# Patient Record
Sex: FEMALE | Race: WHITE | NOT HISPANIC OR LATINO | Employment: OTHER | URBAN - METROPOLITAN AREA
[De-identification: names, ages, dates, MRNs, and addresses within clinical notes are randomized per-mention and may not be internally consistent; named-entity substitution may affect disease eponyms.]

---

## 2017-03-20 ENCOUNTER — OFFICE VISIT (OUTPATIENT)
Dept: UROLOGY | Facility: CLINIC | Age: 67
End: 2017-03-20
Payer: MEDICARE

## 2017-03-20 VITALS
HEART RATE: 56 BPM | DIASTOLIC BLOOD PRESSURE: 92 MMHG | BODY MASS INDEX: 25.07 KG/M2 | HEIGHT: 66 IN | SYSTOLIC BLOOD PRESSURE: 131 MMHG | WEIGHT: 156 LBS

## 2017-03-20 DIAGNOSIS — R31.29 MICROSCOPIC HEMATURIA: Primary | ICD-10-CM

## 2017-03-20 DIAGNOSIS — N95.2 VAGINAL ATROPHY: ICD-10-CM

## 2017-03-20 PROBLEM — M81.0 OSTEOPOROSIS: Status: ACTIVE | Noted: 2017-03-20

## 2017-03-20 PROBLEM — I10 HYPERTENSION: Status: ACTIVE | Noted: 2017-03-20

## 2017-03-20 PROBLEM — I48.20 CHRONIC ATRIAL FIBRILLATION: Status: ACTIVE | Noted: 2017-03-20

## 2017-03-20 PROBLEM — I10 HYPERTENSION: Status: RESOLVED | Noted: 2017-03-20 | Resolved: 2017-03-20

## 2017-03-20 PROCEDURE — 81002 URINALYSIS NONAUTO W/O SCOPE: CPT | Mod: PBBFAC | Performed by: UROLOGY

## 2017-03-20 PROCEDURE — 99203 OFFICE O/P NEW LOW 30 MIN: CPT | Mod: PBBFAC | Performed by: UROLOGY

## 2017-03-20 PROCEDURE — 88112 CYTOPATH CELL ENHANCE TECH: CPT | Mod: 26,,, | Performed by: PATHOLOGY

## 2017-03-20 PROCEDURE — 99999 PR PBB SHADOW E&M-NEW PATIENT-LVL III: CPT | Mod: PBBFAC,,, | Performed by: UROLOGY

## 2017-03-20 PROCEDURE — 88112 CYTOPATH CELL ENHANCE TECH: CPT | Performed by: PATHOLOGY

## 2017-03-20 PROCEDURE — 99205 OFFICE O/P NEW HI 60 MIN: CPT | Mod: S$PBB,,, | Performed by: UROLOGY

## 2017-03-20 RX ORDER — ESTRADIOL 0.1 MG/G
1 CREAM VAGINAL
Qty: 45 G | Refills: 3 | Status: SHIPPED | OUTPATIENT
Start: 2017-03-20 | End: 2020-06-11

## 2017-03-20 RX ORDER — CIPROFLOXACIN 250 MG/1
500 TABLET, FILM COATED ORAL ONCE
Status: CANCELLED | OUTPATIENT
Start: 2017-03-20 | End: 2017-03-20

## 2017-03-20 RX ORDER — RIVAROXABAN 20 MG/1
20 TABLET, FILM COATED ORAL DAILY
COMMUNITY
Start: 2017-03-18 | End: 2020-03-25 | Stop reason: SDUPTHER

## 2017-03-20 RX ORDER — LIDOCAINE HYDROCHLORIDE 20 MG/ML
JELLY TOPICAL ONCE
Status: CANCELLED | OUTPATIENT
Start: 2017-03-20 | End: 2017-03-20

## 2017-03-20 NOTE — MR AVS SNAPSHOT
[facility] Urology 4th Floor  [address]  [facility] [address]  Phone: [phone]                  Eliz Hartman   3/20/2017 3:20 PM   Office Visit    Description:  Female : 1950   Provider:  Rosie Monaco MD   Department:  [facility] - Urolog 4th Floor           Reason for Visit     Hematuria           Diagnoses this Visit        Comments    Vaginal atrophy    -  Primary     Microscopic hematuria         Chronic atrial fibrillation         Osteoporosis                To Do List           Future Appointments        Provider Department Dept Phone    3/20/2017 4:55 PM LAB, SAME DAY [facility] [phone]    2017 8:00 AM Tomasa Garcia MD [facility] - Endo/Diab/Metab [phone]      Goals (5 Years of Data)     None       These Medications        Disp Refills Start End    estradiol (ESTRACE) 0.01 % (0.1 mg/gram) vaginal cream 45 g 3 3/20/2017 2017    Place 1 g vaginally 3 (three) times a week. Place by fingertip application before bedtime three times a week (Monday, Wednesday, Friday) - Vaginal    Pharmacy: [facility] [address] [place] [address] [id number] [facility] [address]  & [facility] Ph #: [phone]         [facility]     [facility] Care Line -  Assistance  Registered nurses in the [facility] provide clinical advisement, health education, appointment booking, and other advisory services.  Call for this free service at [phone].             Medications           Message regarding Medications     Verify the changes and/or additions to your medication regime listed below are the same as discussed with your clinician today.  If any of these changes or additions are incorrect, please notify your healthcare provider.        START taking these NEW medications        Refills    estradiol (ESTRACE) 0.01 % (0.1 mg/gram) vaginal cream 3    Sig: Place 1 g vaginally 3 (three) times a week. Place by fingertip  "application before bedtime three times a week (Monday, Wednesday, Friday)    Class: Print    Route: Vaginal           Verify that the below list of medications is an accurate representation of the medications you are currently taking.  If none reported, the list may be blank. If incorrect, please contact your healthcare provider. Carry this list with you in case of emergency.           Current Medications     estradiol (ESTRACE) 0.01 % (0.1 mg/gram) vaginal cream Place 1 g vaginally 3 (three) times a week. Place by fingertip application before bedtime three times a week (Monday, Wednesday, Friday)    XARELTO 20 mg Tab Take 20 mg by mouth once daily.           Clinical Reference Information           Your Vitals Were     BP Pulse Height Weight BMI    131/92 56 5' 6" (1.676 m) 70.8 kg (156 lb) 25.18 kg/m2      Blood Pressure          Most Recent Value    BP  (!)  131/92      Allergies as of 3/20/2017     No Known Allergies      Immunizations Administered on Date of Encounter - 3/20/2017     None      Orders Placed During Today's Visit      Normal Orders This Visit    Cytology, urine     Future Labs/Procedures Expected by Expires    Basic metabolic panel  3/20/2017 5/19/2018    CT Abdomen Pelvis W Wo Contrast  3/20/2017 3/20/2018    Cystoscopy  As directed 3/20/2018      [facility] Sign-Up     Activating your [facility] account is as easy as 1-2-3!     1) Visit [email or web address], select Sign Up Now, enter this activation code and your date of birth, then select Next.  [email or web address]  Expires: 5/4/2017  3:20 PM      2) Create a username and password to use when you visit [facility] in the future and select a security question in case you lose your password and select Next.    3) Enter your e-mail address and click Sign Up!    Additional Information  If you have questions, please e-mail [email or web address] or call [phone] to talk to our [facility] staff. Remember, [facility] is NOT to be used for urgent needs. For " medical emergencies, dial 911.         Instructions        Cystoscopy    Cystoscopy is a procedure that lets your doctor look directly inside your urethra and bladder. It can be used to:  · Help diagnose a problem with your urethra, bladder, or kidneys.  · Take a sample (biopsy) of bladder or urethral tissue.  · Treat certain problems (such as removing kidney stones).  · Place a stent to bypass an obstruction.  · Take special X-rays of the kidneys.  Based on the findings, your doctor may recommend other tests or treatments.  What is a cystoscope?  A cystoscope is a telescope-like instrument that contains lenses and fiberoptics (small glass wires that make bright light). The cystoscope may be straight and rigid, or flexible to bend around curves in the urethra. The doctor may look directly into the cystoscope, or project the image onto a monitor.  Getting ready  · Ask your doctor if you should stop taking any medications prior to the procedure.  · Ask whether you should avoid eating or drinking anything after midnight before the procedure.  · Follow any other instructions your doctor gives you.  Tell your doctor before the exam if you:  · Take any medications, such as aspirin or blood thinners  · Have allergies to any medications  · Are pregnant   The procedure  Cystoscopy is done in the doctors office or hospital. The doctor and a nurse are present during the procedure. It takes only a few minutes, longer if a biopsy, X-ray, or treatment needs to be done.  During the procedure:  · You lie on an exam table on your back, knees bent and legs apart. You are covered with a drape.  · Your urethra and the area around it are washed. Anesthetic jelly may be applied to numb the urethra. Other pain medication is usually not needed. In some cases, you may be offered a mild sedative to help you relax. If a more extensive procedure is to be done, such as a biopsy or kidney stone removal, general anesthesia may be needed.  · The  cystoscope is inserted. A sterile fluid is put into the bladder to expand it. You may feel pressure from this fluid.  · When the procedure is done, the cystoscope is removed.  After the procedure  If you had a sedative, general anesthesia, or spinal anesthesia, you must have someone drive you home. Once youre home:  · Drink plenty of fluids.  · You may have burning or light bleeding when you urinate--this is normal.  · Medications may be prescribed to ease any discomfort or prevent infection. Take these as directed.  · Call your doctor if you have heavy bleeding or blood clots, burning that lasts more than a day, a fever over 100°F  (38° C), or trouble urinating.  Date Last Reviewed: 9/8/2014 © 2000-2016 [employer]. [address] [place] [address]. All rights reserved. This information is not intended as a substitute for professional medical care. Always follow your healthcare professional's instructions.             Language Assistance Services     ATTENTION: Language assistance services are available, free of charge. Please call [phone].      ATENCIÓN: Si habla español, tiene a bernal disposición servicios gratuitos de asistencia lingüística. Llame al [phone].     PRIMO Ý: N?u b?n nói Ti?ng Vi?t, có các d?ch v? h? tr? ngôn ng? mi?n phí dành cho b?n. G?i s? [phone].         [facility] - Urology 4th Floor complies with applicable Federal civil rights laws and does not discriminate on the basis of race, color, national origin, age, disability, or sex.

## 2017-03-20 NOTE — PROGRESS NOTES
CHIEF COMPLAINT:    Mrs. Hartman is a 66 y.o. female presenting for evaluation of microscopic hematuria    PRESENTING ILLNESS:    Eliz Hartman is a 66 y.o. female who presents with a history of microscopic hematuria.  She brought a report from [facility], states that her primary is joining [facility] and would like to have her healthcare consolidated.  With regards to risk factors, she is a non smoker, there is not history of radiation therapy or chemotherapy.  She is a retired  and has no exposure to aniline dyes or solvents.  There is no family history of kidney or bladder cancer.     G0, uterus in place, status post left unilateral oophorectomy, not sexually active and has normal bowel movements.    REVIEW OF SYSTEMS:    Review of Systems   Constitutional: Negative.    HENT: Negative.    Eyes: Negative.    Respiratory: Negative.    Cardiovascular: Negative.    Gastrointestinal: Negative.    Genitourinary: Negative for dysuria, frequency and urgency.   Musculoskeletal: Negative.    Skin: Negative.    Neurological: Negative.    Endo/Heme/Allergies: Negative.    Psychiatric/Behavioral: Negative.      PATIENT HISTORY:    No past medical history on file.    Past Surgical History:   Procedure Laterality Date    HAND SURGERY      OOPHORECTOMY         No family history on file.    Social History    Marital status: Single     Social History Main Topics    Smoking status: Never Smoker    Smokeless tobacco: Not on file    Alcohol use Yes    Drug use: Not on file    Sexual activity: Not on file       Allergies:  Review of patient's allergies indicates no known allergies.    Medications:  Outpatient Encounter Prescriptions as of 3/20/2017   Medication Sig Dispense Refill    estradiol (ESTRACE) 0.01 % (0.1 mg/gram) vaginal cream Place 1 g vaginally 3 (three) times a week. Place by fingertip application before bedtime three times a week (Monday, Wednesday, Friday) 45 g 3    XARELTO 20 mg Tab Take 20 mg by mouth once daily.        No facility-administered encounter medications on file as of 3/20/2017.          PHYSICAL EXAMINATION:    The patient generally appears in good health, is appropriately interactive, and is in no apparent distress.    Skin: No lesions.    Mental: Cooperative with normal affect.    Neuro: Grossly intact.    HEENT: Normal. No evidence of lymphadenopathy.    Chest:  normal inspiratory effort.    Abdomen: BS active. Soft, non-tender. No masses or organomegaly. Bladder is not palpable. No evidence of flank discomfort. No evidence of inguinal hernia.    Extremities: No clubbing, cyanosis, or edema    Normal external female genitalia  Grade II urogenital atrophy  Urethral meatus is normal  Urethra and bladder are nontender to bimanual exam  Well supported anteriorly and posteriorly   Uterus and cervix are normal  No adnexal masses    LABS:    UA 1.000, pH 8, tr blood, otherwise,    IMPRESSION:    Encounter Diagnoses   Name Primary?    Microscopic hematuria Yes    Vaginal atrophy        PLAN:    1.  BMP, urine cytology  2.  CT abd/pelvis with and without IV contrast  3.  Cystoscopy  4.  Estrace cream for the vaginal atrophy

## 2017-03-20 NOTE — PATIENT INSTRUCTIONS

## 2017-03-31 ENCOUNTER — HOSPITAL ENCOUNTER (OUTPATIENT)
Dept: RADIOLOGY | Facility: HOSPITAL | Age: 67
Discharge: HOME | End: 2017-03-31
Attending: UROLOGY
Payer: MEDICARE

## 2017-03-31 DIAGNOSIS — R31.29 MICROSCOPIC HEMATURIA: ICD-10-CM

## 2017-03-31 PROCEDURE — 25500020 PHARM REV CODE 255: Performed by: UROLOGY

## 2017-03-31 PROCEDURE — 74178 CT ABD&PLV WO CNTR FLWD CNTR: CPT | Mod: TC

## 2017-03-31 PROCEDURE — 74178 CT ABD&PLV WO CNTR FLWD CNTR: CPT | Mod: 26,GC,, | Performed by: RADIOLOGY

## 2017-03-31 RX ADMIN — IOHEXOL 125 ML: 350 INJECTION, SOLUTION INTRAVENOUS at 04:03

## 2017-04-02 ENCOUNTER — PATIENT MESSAGE (OUTPATIENT)
Dept: UROLOGY | Facility: CLINIC | Age: 67
End: 2017-04-02

## 2017-04-03 ENCOUNTER — HOSPITAL ENCOUNTER (OUTPATIENT)
Dept: UROLOGY | Facility: HOSPITAL | Age: 67
Discharge: HOME | End: 2017-04-03
Attending: UROLOGY
Payer: MEDICARE

## 2017-04-03 VITALS
HEIGHT: 66 IN | WEIGHT: 150 LBS | TEMPERATURE: 99 F | BODY MASS INDEX: 24.11 KG/M2 | DIASTOLIC BLOOD PRESSURE: 79 MMHG | HEART RATE: 56 BPM | SYSTOLIC BLOOD PRESSURE: 130 MMHG | RESPIRATION RATE: 16 BRPM

## 2017-04-03 DIAGNOSIS — R31.29 MICROSCOPIC HEMATURIA: ICD-10-CM

## 2017-04-03 PROCEDURE — 52000 CYSTOURETHROSCOPY: CPT | Mod: ,,, | Performed by: UROLOGY

## 2017-04-03 PROCEDURE — 52000 CYSTOURETHROSCOPY: CPT

## 2017-04-03 RX ORDER — CIPROFLOXACIN 500 MG/1
500 TABLET ORAL
Status: COMPLETED | OUTPATIENT
Start: 2017-04-03 | End: 2017-04-03

## 2017-04-03 RX ORDER — LIDOCAINE HYDROCHLORIDE 20 MG/ML
JELLY TOPICAL ONCE
Status: COMPLETED | OUTPATIENT
Start: 2017-04-03 | End: 2017-04-03

## 2017-04-03 RX ADMIN — LIDOCAINE HYDROCHLORIDE: 20 JELLY TOPICAL at 10:04

## 2017-04-03 RX ADMIN — CIPROFLOXACIN 500 MG: 500 TABLET ORAL at 10:04

## 2017-04-03 NOTE — PATIENT INSTRUCTIONS
What to Expect After a Cystoscopy  For the next 24-48 hours, you may feel a mild burning when you urinate. This burning is normal and expected. Drink plenty of water to dilute the urine to help relieve the burning sensation. You may also see a small amount of blood in your urine after the procedure.    Unless you are already taking antibiotics, you may be given an antibiotic after the test to prevent infection.    Signs and Symptoms to Report  Call the [facility] at [phone] if you develop any of the following:  · Fever of 101 degrees or higher  · Chills or persistent bleeding  · Inability to urinate

## 2017-04-03 NOTE — INTERVAL H&P NOTE
The patient has been examined and the H&P has been reviewed:    I concur with the findings and no changes have occurred since H&P was written.        There are no hospital problems to display for this patient.    Proceed with planned cystoscopy.

## 2017-04-03 NOTE — H&P (VIEW-ONLY)
CHIEF COMPLAINT:    Mrs. Hartman is a 66 y.o. female presenting for evaluation of microscopic hematuria    PRESENTING ILLNESS:    Eliz Hartman is a 66 y.o. female who presents with a history of microscopic hematuria.  She brought a report from [facility], states that her primary is joining [facility] and would like to have her healthcare consolidated.  With regards to risk factors, she is a non smoker, there is not history of radiation therapy or chemotherapy.  She is a retired  and has no exposure to aniline dyes or solvents.  There is no family history of kidney or bladder cancer.     G0, uterus in place, status post left unilateral oophorectomy, not sexually active and has normal bowel movements.    REVIEW OF SYSTEMS:    Review of Systems   Constitutional: Negative.    HENT: Negative.    Eyes: Negative.    Respiratory: Negative.    Cardiovascular: Negative.    Gastrointestinal: Negative.    Genitourinary: Negative for dysuria, frequency and urgency.   Musculoskeletal: Negative.    Skin: Negative.    Neurological: Negative.    Endo/Heme/Allergies: Negative.    Psychiatric/Behavioral: Negative.      PATIENT HISTORY:    No past medical history on file.    Past Surgical History:   Procedure Laterality Date    HAND SURGERY      OOPHORECTOMY         No family history on file.    Social History    Marital status: Single     Social History Main Topics    Smoking status: Never Smoker    Smokeless tobacco: Not on file    Alcohol use Yes    Drug use: Not on file    Sexual activity: Not on file       Allergies:  Review of patient's allergies indicates no known allergies.    Medications:  Outpatient Encounter Prescriptions as of 3/20/2017   Medication Sig Dispense Refill    estradiol (ESTRACE) 0.01 % (0.1 mg/gram) vaginal cream Place 1 g vaginally 3 (three) times a week. Place by fingertip application before bedtime three times a week (Monday, Wednesday, Friday) 45 g 3    XARELTO 20 mg Tab Take 20 mg by mouth once daily.        No facility-administered encounter medications on file as of 3/20/2017.          PHYSICAL EXAMINATION:    The patient generally appears in good health, is appropriately interactive, and is in no apparent distress.    Skin: No lesions.    Mental: Cooperative with normal affect.    Neuro: Grossly intact.    HEENT: Normal. No evidence of lymphadenopathy.    Chest:  normal inspiratory effort.    Abdomen: BS active. Soft, non-tender. No masses or organomegaly. Bladder is not palpable. No evidence of flank discomfort. No evidence of inguinal hernia.    Extremities: No clubbing, cyanosis, or edema    Normal external female genitalia  Grade II urogenital atrophy  Urethral meatus is normal  Urethra and bladder are nontender to bimanual exam  Well supported anteriorly and posteriorly   Uterus and cervix are normal  No adnexal masses    LABS:    UA 1.000, pH 8, tr blood, otherwise,    IMPRESSION:    Encounter Diagnoses   Name Primary?    Microscopic hematuria Yes    Vaginal atrophy        PLAN:    1.  BMP, urine cytology  2.  CT abd/pelvis with and without IV contrast  3.  Cystoscopy  4.  Estrace cream for the vaginal atrophy

## 2017-04-03 NOTE — PROCEDURES
CYSTOSCOPY REPORT    Pre Procedure Diagnosis:  Microscopic hematuria    Post Procedure Diagnosis:   Normal lower urinary tract, vaginal atrophy    Anesthesia: 10 cc 2% lidocaine jelly applied per urethra.    14 FR Flexible Olympus cystoscope used.    FINDINGS:  Dome, anterior, posterior, lateral walls and bladder base free of urothelial abnormalities. Right and left ureteral orifices in the normal postion and configuration, both effluxed clear urine.  Bladder neck and urethra were normal.    Specimen:  none    The patient was taken to the cystoscopy suite and placed in dorsal lithotomy position.  The genitalia was prepped and draped  in the usual sterile fashion.  Two percent lidocaine jelly was inserted in the urethra.  After sufficent time had passed to allow good local anesthesia, the cystoscope was inserted in the urethra and passed into the bladder visualizing the urethra along its entire course.  The dome, anterior, posterior and lateral walls were examined systematically.  The ureteral orifices were in their usual position and configuration.  The cystoscope was turned upon itself 180 degrees to visualize the bladder neck.  The cystoscope was then brought to the level of the bladder neck, the water was turned on and the urethra was visualized.  The cystoscope was removed and the patient was instructed to urinate prior to leaving the office.     Post procedure medication:  cipro 500 mg x 1    ADDITIONAL NOTES:  CT scan was reviewed with the patient.  She denies having abdominal pain after eating a large meal, has no flank pain with drinking increased fluids.  The uptake of the right kidney of contrast equals that of the left side.  Neither the median arcuate ligament syndrome nor the right UPJ obstruction appear to be clinically significant.  The right UPJ obstruction is in the form of a kink, not a ureteral mass.       ASSESSMENT/PLAN:  66 year old woman status post flexible cystoscopy.  Microscopic hematuria  is likely secondary to vaginal atrophy.  1. Push fluids for 24 hours.  2. May see blood in the urine, this should gradually improve over the next 2-3 days.  3. The patient was instructed to return to the office or go to the emergency should fever, chills, cloudy urine, or inability to urinate develop.  4. Follow up in 1 year to follow up on the vaginal atrophy.

## 2017-04-03 NOTE — IP AVS SNAPSHOT
"    [facility]  [address] [facility] [address]  Phone: [phone]  Fax: [phone]                  Eliz Hartman   4/3/2017  9:45 AM   Cystoscopy    Description:  Female : 1950   Provider:  [facility]   [facility]           Visit Information     Date & Time Provider Department    4/3/2017  9:45 AM JOHANNA GARCIAY [facility]      Recent Lab Values     No lab values to display.      Reason for Visit     Hematuria           Diagnoses this Visit        Comments    Microscopic hematuria                To Do List           Your Scheduled Appointments     2017  8:00 AM CDT   New Patient with Tomasa Garcia MD   [facility] - Endo/Diab/Metab ([facility] )    [address]  [place] [address] [phone]              Goals (5 Years of Data)     None           Medications                ** Verify the list of medication(s) below is accurate and up to date. Carry this with you in case of emergency. If your medications have changed, please notify your healthcare provider.             Medication List      TAKE these medications        Additional Info                      estradiol 0.01 % (0.1 mg/gram) vaginal cream   Commonly known as:  ESTRACE   Quantity:  45 g   Refills:  3   Dose:  1 g    Instructions:  Place 1 g vaginally 3 (three) times a week. Place by fingertip application before bedtime three times a week (Monday, Wednesday, Friday)     Begin Date    AM    Noon    PM    Bedtime       XARELTO 20 mg Tab   Refills:  0   Dose:  20 mg   Generic drug:  rivaroxaban    Instructions:  Take 20 mg by mouth once daily.     Begin Date    AM    Noon    PM    Bedtime               Your Vitals Were     Pulse Temp Resp Height Weight BMI    56 98.6 °F (37 °C) 16 5' 6" (1.676 m) 68 kg (150 lb) 24.21 kg/m2      Allergies as of 4/3/2017     No Known Allergies      Immunizations Administered on Date " of Encounter - 4/3/2017     None      Instructions    What to Expect After a Cystoscopy  For the next 24-48 hours, you may feel a mild burning when you urinate. This burning is normal and expected. Drink plenty of water to dilute the urine to help relieve the burning sensation. You may also see a small amount of blood in your urine after the procedure.    Unless you are already taking antibiotics, you may be given an antibiotic after the test to prevent infection.    Signs and Symptoms to Report  Call the [facility] at [phone] if you develop any of the following:  · Fever of 101 degrees or higher  · Chills or persistent bleeding  · Inability to urinate       Advance Directives     An advance directive is a document which, in the event you are no longer able to make decisions for yourself, tells your healthcare team what kind of treatment you do or do not want to receive, or who you would like to make those decisions for you.  If you do not currently have an advance directive, [facility] encourages you to create one.  For more information call:  [phone]), [phone] ([phone]),  or log on to [email or web address].        [facility]     [facility] Care [facility] - 24/7 Assistance  Unless otherwise directed by your provider, please contact [facility], our nurse care line that is available for 24/7 assistance.     Registered nurses in the [facility] provide: appointment scheduling, clinical advisement, health education, and other advisory services.  Call: [phone] (toll free)          Language Assistance Services     ATTENTION: Language assistance services are available, free of charge. Please call [phone].      ATENCIÓN: Si habla español, tiene a bernal disposición servicios gratuitos de asistencia lingüística. Llame al [phone].     CHÚ Ý: N?u b?n nói Ti?ng Vi?t, có các d?ch v? h? tr? ngôn ng? mi?n phí dành cho b?n. G?i s?  [phone].         [facility] complies with applicable Federal civil rights laws and does not discriminate on the basis of race, color, national origin, age, disability, or sex.

## 2017-06-12 ENCOUNTER — OFFICE VISIT (OUTPATIENT)
Dept: ENDOCRINOLOGY | Facility: CLINIC | Age: 67
End: 2017-06-12
Payer: MEDICARE

## 2017-06-12 ENCOUNTER — PATIENT MESSAGE (OUTPATIENT)
Dept: ENDOCRINOLOGY | Facility: CLINIC | Age: 67
End: 2017-06-12

## 2017-06-12 VITALS
BODY MASS INDEX: 25.55 KG/M2 | HEIGHT: 66 IN | HEART RATE: 68 BPM | DIASTOLIC BLOOD PRESSURE: 72 MMHG | WEIGHT: 159 LBS | SYSTOLIC BLOOD PRESSURE: 132 MMHG

## 2017-06-12 DIAGNOSIS — M81.0 OSTEOPOROSIS, UNSPECIFIED OSTEOPOROSIS TYPE, UNSPECIFIED PATHOLOGICAL FRACTURE PRESENCE: ICD-10-CM

## 2017-06-12 DIAGNOSIS — E04.2 GOITER, NONTOXIC, MULTINODULAR: Primary | ICD-10-CM

## 2017-06-12 PROCEDURE — 99999 PR PBB SHADOW E&M-EST. PATIENT-LVL III: CPT | Mod: PBBFAC,,, | Performed by: INTERNAL MEDICINE

## 2017-06-12 PROCEDURE — 99203 OFFICE O/P NEW LOW 30 MIN: CPT | Mod: S$PBB,,, | Performed by: INTERNAL MEDICINE

## 2017-06-12 PROCEDURE — 99213 OFFICE O/P EST LOW 20 MIN: CPT | Mod: PBBFAC | Performed by: INTERNAL MEDICINE

## 2017-06-12 PROCEDURE — 1126F AMNT PAIN NOTED NONE PRSNT: CPT | Mod: ,,, | Performed by: INTERNAL MEDICINE

## 2017-06-12 PROCEDURE — 1159F MED LIST DOCD IN RCRD: CPT | Mod: ,,, | Performed by: INTERNAL MEDICINE

## 2017-06-12 RX ORDER — ALENDRONATE SODIUM 70 MG/1
70 TABLET ORAL
COMMUNITY
Start: 2017-04-04 | End: 2018-05-24

## 2017-06-12 NOTE — ASSESSMENT & PLAN NOTE
I have reviewed management options including observation, FNA or surgery.  All of the patients questions were answered and handout was provided.     Discussed indications for a FNA - hers would be right nodule 1.5 cm n size since i am not sure what nodules were biopsied in the past      She opted for observation which is quite reasonable based on lack of thyroid cancer risk factors and overall stability over time     rtc early next year with US prior

## 2017-06-12 NOTE — PROGRESS NOTES
Subjective:      Patient ID: Eliz Hartman is a 66 y.o. female.    Chief Complaint:  Other (Thyroid nodules)      History of Present Illness  With regards to the thyroid nodule:  It was found in there early 50s   She is not concerned about the nodules   She spends 1/2 year here and 1/2 in [place]     No difficulty breathing swallowing   No voice changes  No FH of thyroid cancer  No personal history of radiation treatment or exposure     No signs or symptoms of hyper or hypothyroidism    No weight changes  No bowel changes  No heat or cold intolerance   No hair nail or skin changes  No cp, palpations or sob    She did bring in a thyroid us report from 1/26/17 which was compared 2012 and 2013 reports   Right Heterogenous nodule near isthmus 1.2x0.7x1.5 from 1.1x0.6x1.1 cm  2 sub cm solid nodules  right lobe  +subcm cysts   Left heterogenous 0.7 cm +smaller nodules   No LAD   Total volume of the gland had increased , right nodule increased in size     Us from [place] 2013  Right 1.5 cm and second was 1.1   TSH 0.9 2014   tpo neg   Us from [place] 2012 increase involume of right nodules and decrease of volume of others when compared to 2010       She had 3 fnas in the past and all neg - she will get me records       With regards to osteoporosis:     current medication: fosamax  Started early 2017   Managed by gyn     Takes Calcium +d supplements     Weight bearing exercise?   Walking   Recent falls? No      Left foot stress fracture   Right foot fracture after a fall   No significant height loss (>2 inches)      tob use ?  None       etoh use? Social      No current diarrhea or h/o malabsorption     Review of Systems   Constitutional: Negative for unexpected weight change.   Eyes: Negative for visual disturbance.   Respiratory: Negative for shortness of breath.    Cardiovascular: Negative for chest pain.   Gastrointestinal: Negative for abdominal pain.   Musculoskeletal: Negative for arthralgias.   Skin: Negative for wound.    Neurological: Negative for headaches.   Hematological: Does not bruise/bleed easily.   Psychiatric/Behavioral: Negative for sleep disturbance.       Objective:   Physical Exam   Constitutional: She appears well-developed.   HENT:   Right Ear: External ear normal.   Left Ear: External ear normal.   Nose: Nose normal.   Neck: No tracheal deviation present.   Thyroid palpable, irregular, non tender, mobile  Right small nodule      Cardiovascular: Normal rate.  An irregular rhythm present.   No murmur heard.  Pulmonary/Chest: Effort normal and breath sounds normal.   Abdominal: Soft. There is no tenderness. No hernia.   Musculoskeletal: She exhibits no edema.   Neurological: She displays normal reflexes. No cranial nerve deficit.   Skin: No rash noted.          Psychiatric: She has a normal mood and affect. Judgment normal.   Vitals reviewed.      Body mass index is 25.66 kg/m².    Lab Review:   No results found for: HGBA1C  No results found for: CHOL, HDL, LDLCALC, TRIG, CHOLHDL  Lab Results   Component Value Date     03/20/2017    K 4.2 03/20/2017     03/20/2017    CO2 26 03/20/2017    GLU 85 03/20/2017    BUN 23 03/20/2017    CREATININE 0.9 03/20/2017    CALCIUM 9.1 03/20/2017    ANIONGAP 10 03/20/2017    ESTGFRAFRICA >60.0 03/20/2017    EGFRNONAA >60.0 03/20/2017       Assessment and Plan     Osteoporosis  On fosamax   Expect 5 yrs of therapy   rda calcium and vit d     Goiter, nontoxic, multinodular  I have reviewed management options including observation, FNA or surgery.  All of the patients questions were answered and handout was provided.     Discussed indications for a FNA - hers would be right nodule 1.5 cm n size since i am not sure what nodules were biopsied in the past      She opted for observation which is quite reasonable based on lack of thyroid cancer risk factors and overall stability over time     rtc early next year with US prior

## 2017-07-04 NOTE — PROGRESS NOTES
Subjective:      Patient ID: Eliz Hartman is a 66 y.o. female.    Chief Complaint: Cough and Nasal Congestion    HPI:  Cough   This is a recurrent problem. The current episode started in the past 7 days. The problem has been waxing and waning. The problem occurs hourly. The cough is productive of sputum. Associated symptoms include ear congestion, a fever, headaches, nasal congestion, postnasal drip, a sore throat and wheezing. Pertinent negatives include no chest pain, chills, ear pain, heartburn, hemoptysis, myalgias, rash, rhinorrhea, shortness of breath, sweats or weight loss. The symptoms are aggravated by lying down. She has tried OTC cough suppressant, OTC inhaler and body position changes for the symptoms. The treatment provided mild relief. Her past medical history is significant for bronchitis. There is no history of asthma, bronchiectasis, COPD, emphysema, environmental allergies or pneumonia.     Patient Active Problem List   Diagnosis    Microscopic hematuria    Chronic atrial fibrillation    Osteoporosis    Goiter, nontoxic, multinodular     Past Medical History:   Diagnosis Date    Atrial fibrillation     Multiple thyroid nodules     Osteoporosis      Past Surgical History:   Procedure Laterality Date    HAND SURGERY      OOPHORECTOMY       Family History   Problem Relation Age of Onset    Atrial fibrillation Mother     Parkinsonism Father     Atrial fibrillation Sister     Atrial fibrillation Brother      Review of Systems   Constitutional: Positive for fever. Negative for chills and weight loss.   HENT: Positive for postnasal drip and sore throat. Negative for ear pain and rhinorrhea.    Respiratory: Positive for cough and wheezing. Negative for hemoptysis and shortness of breath.    Cardiovascular: Negative for chest pain.   Gastrointestinal: Negative for heartburn.   Musculoskeletal: Negative for myalgias.   Skin: Negative for rash.   Allergic/Immunologic: Negative for environmental  "allergies.   Neurological: Positive for headaches.     Objective:     Vitals:    07/05/17 0936   BP: (!) 142/82   Pulse: 78   SpO2: 98%   Weight: 70.7 kg (155 lb 13.8 oz)   Height: 5' 6" (1.676 m)   PainSc:   5   PainLoc: Throat     Body mass index is 25.16 kg/m².  Physical Exam   Constitutional: She is oriented to person, place, and time. She appears well-developed and well-nourished. No distress.   Neck: Carotid bruit is not present. No thyromegaly present.   Cardiovascular: Normal rate, regular rhythm and normal heart sounds.  PMI is not displaced.    Pulmonary/Chest: Effort normal and breath sounds normal. No respiratory distress.   Abdominal: Soft. Bowel sounds are normal. She exhibits no distension. There is no tenderness.   Musculoskeletal: She exhibits no edema.   Neurological: She is alert and oriented to person, place, and time.     Assessment:     1. Cough      Plan:   Eliz was seen today for cough and nasal congestion.    Diagnoses and all orders for this visit:    Cough  -     X-Ray Chest PA And Lateral; Future    Nasal rinse, antihistamines, nasal steroids and  Patient was given a Z pack by her brother       Medication List          Accurate as of 7/5/17 10:03 AM. If you have any questions, ask your nurse or doctor.               CONTINUE taking these medications    alendronate 70 MG tablet  Commonly known as:  FOSAMAX     CALCIUM 500 WITH D ORAL     estradiol 0.01 % (0.1 mg/gram) vaginal cream  Commonly known as:  ESTRACE  Place 1 g vaginally 3 (three) times a week. Place by fingertip application before bedtime three times a week (Monday, Wednesday, Friday)     XARELTO 20 mg Tab  Generic drug:  rivaroxaban          "

## 2017-07-05 ENCOUNTER — HOSPITAL ENCOUNTER (OUTPATIENT)
Dept: RADIOLOGY | Facility: HOSPITAL | Age: 67
Discharge: HOME | End: 2017-07-05
Attending: INTERNAL MEDICINE
Payer: MEDICARE

## 2017-07-05 ENCOUNTER — OFFICE VISIT (OUTPATIENT)
Dept: INTERNAL MEDICINE | Facility: CLINIC | Age: 67
End: 2017-07-05
Payer: MEDICARE

## 2017-07-05 ENCOUNTER — PATIENT MESSAGE (OUTPATIENT)
Dept: INTERNAL MEDICINE | Facility: CLINIC | Age: 67
End: 2017-07-05

## 2017-07-05 VITALS
SYSTOLIC BLOOD PRESSURE: 142 MMHG | DIASTOLIC BLOOD PRESSURE: 82 MMHG | HEART RATE: 78 BPM | HEIGHT: 66 IN | OXYGEN SATURATION: 98 % | WEIGHT: 155.88 LBS | BODY MASS INDEX: 25.05 KG/M2

## 2017-07-05 DIAGNOSIS — R05.9 COUGH: Primary | ICD-10-CM

## 2017-07-05 DIAGNOSIS — R05.9 COUGH: ICD-10-CM

## 2017-07-05 PROCEDURE — 1159F MED LIST DOCD IN RCRD: CPT | Mod: ,,, | Performed by: INTERNAL MEDICINE

## 2017-07-05 PROCEDURE — 99999 PR PBB SHADOW E&M-EST. PATIENT-LVL III: CPT | Mod: PBBFAC,,, | Performed by: INTERNAL MEDICINE

## 2017-07-05 PROCEDURE — 71020 XR CHEST PA AND LATERAL: CPT | Mod: TC

## 2017-07-05 PROCEDURE — 71020 XR CHEST PA AND LATERAL: CPT | Mod: 26,,, | Performed by: RADIOLOGY

## 2017-07-05 PROCEDURE — 1125F AMNT PAIN NOTED PAIN PRSNT: CPT | Mod: ,,, | Performed by: INTERNAL MEDICINE

## 2017-07-05 PROCEDURE — 99203 OFFICE O/P NEW LOW 30 MIN: CPT | Mod: S$PBB,,, | Performed by: INTERNAL MEDICINE

## 2017-07-12 ENCOUNTER — OFFICE VISIT (OUTPATIENT)
Dept: PODIATRY | Facility: CLINIC | Age: 67
End: 2017-07-12
Payer: MEDICARE

## 2017-07-12 VITALS
SYSTOLIC BLOOD PRESSURE: 133 MMHG | HEART RATE: 62 BPM | WEIGHT: 158 LBS | RESPIRATION RATE: 18 BRPM | BODY MASS INDEX: 25.39 KG/M2 | HEIGHT: 66 IN | DIASTOLIC BLOOD PRESSURE: 87 MMHG

## 2017-07-12 DIAGNOSIS — M21.619 BUNION: Primary | ICD-10-CM

## 2017-07-12 DIAGNOSIS — M20.42 HAMMER TOES OF BOTH FEET: ICD-10-CM

## 2017-07-12 DIAGNOSIS — M20.41 HAMMER TOES OF BOTH FEET: ICD-10-CM

## 2017-07-12 DIAGNOSIS — L84 CORN OR CALLUS: ICD-10-CM

## 2017-07-12 DIAGNOSIS — M21.629 BUNIONETTE: ICD-10-CM

## 2017-07-12 PROCEDURE — 99999 PR PBB SHADOW E&M-EST. PATIENT-LVL III: CPT | Mod: PBBFAC,,, | Performed by: PODIATRIST

## 2017-07-12 PROCEDURE — 99213 OFFICE O/P EST LOW 20 MIN: CPT | Mod: PBBFAC | Performed by: PODIATRIST

## 2017-07-12 PROCEDURE — 1125F AMNT PAIN NOTED PAIN PRSNT: CPT | Mod: ,,, | Performed by: PODIATRIST

## 2017-07-12 PROCEDURE — 1159F MED LIST DOCD IN RCRD: CPT | Mod: ,,, | Performed by: PODIATRIST

## 2017-07-12 PROCEDURE — 99203 OFFICE O/P NEW LOW 30 MIN: CPT | Mod: S$PBB,,, | Performed by: PODIATRIST

## 2017-07-12 NOTE — PROGRESS NOTES
Subjective:      Patient ID: Eliz Hartman is a 66 y.o. female.    Chief Complaint: PCP (Nancy Bonilla MD  7/5/17); Callouses; Foot Pain; and Nail Problem    Eliz is a 66 y.o. female who presents to the clinic complaining of  Calluses  Which are sometimes painful to R foot.  She does not frequently get pedicures        Patient Active Problem List   Diagnosis    Microscopic hematuria    Chronic atrial fibrillation    Osteoporosis    Goiter, nontoxic, multinodular       Current Outpatient Prescriptions on File Prior to Visit   Medication Sig Dispense Refill    alendronate (FOSAMAX) 70 MG tablet Take 70 mg by mouth every 7 days.       CALCIUM CARBONATE/VITAMIN D3 (CALCIUM 500 WITH D ORAL) Take 2 each by mouth once daily at 6am.      estradiol (ESTRACE) 0.01 % (0.1 mg/gram) vaginal cream Place 1 g vaginally 3 (three) times a week. Place by fingertip application before bedtime three times a week (Monday, Wednesday, Friday) 45 g 3    XARELTO 20 mg Tab Take 20 mg by mouth once daily.       No current facility-administered medications on file prior to visit.        Review of patient's allergies indicates:  No Known Allergies    Past Surgical History:   Procedure Laterality Date    HAND SURGERY      OOPHORECTOMY         Family History   Problem Relation Age of Onset    Atrial fibrillation Mother     Parkinsonism Father     Atrial fibrillation Sister     Atrial fibrillation Brother        Social History     Social History    Marital status: Single     Spouse name: N/A    Number of children: N/A    Years of education: N/A     Occupational History    Not on file.     Social History Main Topics    Smoking status: Never Smoker    Smokeless tobacco: Not on file    Alcohol use Yes    Drug use: No    Sexual activity: Not on file     Other Topics Concern    Not on file     Social History Narrative    No narrative on file           Review of Systems   Constitution: Negative for chills, decreased appetite  "and fever.   Cardiovascular: Negative for leg swelling.   Musculoskeletal: Negative for arthritis, joint pain, joint swelling and myalgias.   Gastrointestinal: Negative for nausea and vomiting.   Neurological: Negative for loss of balance, numbness and paresthesias.           Objective:       Vitals:    07/12/17 1110   BP: 133/87   Pulse: 62   Resp: 18   Weight: 71.7 kg (158 lb)   Height: 5' 6" (1.676 m)   PainSc:   5   PainLoc: Foot        Physical Exam   Constitutional: She is oriented to person, place, and time. She appears well-developed and well-nourished.   Cardiovascular:   Pulses:       Dorsalis pedis pulses are 2+ on the right side, and 2+ on the left side.        Posterior tibial pulses are 2+ on the right side, and 2+ on the left side.   Musculoskeletal: She exhibits no edema or tenderness.        Right ankle: Normal.        Left ankle: Normal.        Right foot: There is no swelling, no crepitus and no deformity.        Left foot: There is no swelling, no crepitus and no deformity.   Adequate joint range of motion without pain, limitation, nor crepitation Bilateral feet and ankle joints. Muscle strength is 5/5 in all groups bilaterally.      1st MPJ exostosis w/ medial deviation of hallux, non trackbound. No pain w/ ROM to r hallux  5th MPJ exostosis w/ lateral deviation of 5th metatarsal  No pain w/ ROM r pain with palpation of exostosis  semi reducible IPJ digital contracture 2-4 R  Adductovarus rotation of R fifth digit        Lymphadenopathy:   No palpable lymph nodes   Neurological: She is alert and oriented to person, place, and time. She has normal strength.   Skin: Skin is warm, dry and intact. No rash noted. No erythema. Nails show no clubbing.   Psychiatric: She has a normal mood and affect. Her behavior is normal.             Assessment:       Encounter Diagnoses   Name Primary?    Bunion Yes    Bunionette     Corn or callus     Hammer toes of both feet          Plan:       Eliz was seen " today for pcp, callouses, foot pain and nail problem.    Diagnoses and all orders for this visit:    Bunion    Bunionette    Corn or callus    Hammer toes of both feet      I counseled the patient on her conditions, their implications and medical management.      Callus 2/2 structual deformites as listed   Discussed surgical and conservative management of Bunion/Ht deformity. Conservatively we did discuss padding, and shoe modifications such as softer shoes with wide toe boxes. Surgically we briefly discussed pre and post operative expectations. The patient elects for conservative management at this time

## 2017-07-13 ENCOUNTER — TELEPHONE (OUTPATIENT)
Dept: PODIATRY | Facility: CLINIC | Age: 67
End: 2017-07-13

## 2017-07-13 ENCOUNTER — PATIENT MESSAGE (OUTPATIENT)
Dept: PODIATRY | Facility: CLINIC | Age: 67
End: 2017-07-13

## 2017-07-13 NOTE — TELEPHONE ENCOUNTER
Spoke with patient, not sure of what the prescription cream was for that she is requesting.  Not sure if it was for pain or moisturizer.  Offered to have another provider send rx in if she could delineate what she needed it for in Dr Ambriz's absence but she stated she would prefer to wait until Dr Ambriz returned.

## 2017-07-17 RX ORDER — AMMONIUM LACTATE 12 G/100G
1 CREAM TOPICAL DAILY
Qty: 140 G | Refills: 5 | Status: SHIPPED | OUTPATIENT
Start: 2017-07-17 | End: 2020-05-21

## 2017-07-18 ENCOUNTER — PATIENT MESSAGE (OUTPATIENT)
Dept: PODIATRY | Facility: CLINIC | Age: 67
End: 2017-07-18

## 2017-07-18 RX ORDER — DICLOFENAC SODIUM 10 MG/G
2 GEL TOPICAL 2 TIMES DAILY
Qty: 500 G | Refills: 1 | Status: SHIPPED | OUTPATIENT
Start: 2017-07-18

## 2017-07-19 ENCOUNTER — PATIENT MESSAGE (OUTPATIENT)
Dept: PODIATRY | Facility: CLINIC | Age: 67
End: 2017-07-19

## 2017-07-20 ENCOUNTER — TELEPHONE (OUTPATIENT)
Dept: PODIATRY | Facility: CLINIC | Age: 67
End: 2017-07-20

## 2017-07-20 RX ORDER — MELOXICAM 15 MG/1
15 TABLET ORAL DAILY
Qty: 30 TABLET | Refills: 0 | Status: SHIPPED | OUTPATIENT
Start: 2017-07-20 | End: 2018-05-24

## 2017-08-01 ENCOUNTER — PATIENT MESSAGE (OUTPATIENT)
Dept: PODIATRY | Facility: CLINIC | Age: 67
End: 2017-08-01

## 2017-08-07 ENCOUNTER — PATIENT MESSAGE (OUTPATIENT)
Dept: INTERNAL MEDICINE | Facility: CLINIC | Age: 67
End: 2017-08-07

## 2018-04-23 ENCOUNTER — OFFICE VISIT (OUTPATIENT)
Dept: PODIATRY | Facility: CLINIC | Age: 68
End: 2018-04-23
Payer: MEDICARE

## 2018-04-23 VITALS
WEIGHT: 158 LBS | HEIGHT: 66 IN | DIASTOLIC BLOOD PRESSURE: 76 MMHG | SYSTOLIC BLOOD PRESSURE: 128 MMHG | BODY MASS INDEX: 25.39 KG/M2 | HEART RATE: 63 BPM

## 2018-04-23 DIAGNOSIS — L84 CORN OF TOE: ICD-10-CM

## 2018-04-23 DIAGNOSIS — M79.674 PAIN OF TOE OF RIGHT FOOT: Primary | ICD-10-CM

## 2018-04-23 PROCEDURE — 99213 OFFICE O/P EST LOW 20 MIN: CPT | Mod: PBBFAC | Performed by: PODIATRIST

## 2018-04-23 PROCEDURE — 99999 PR PBB SHADOW E&M-EST. PATIENT-LVL III: CPT | Mod: PBBFAC,,, | Performed by: PODIATRIST

## 2018-04-23 PROCEDURE — 99213 OFFICE O/P EST LOW 20 MIN: CPT | Mod: S$PBB,,, | Performed by: PODIATRIST

## 2018-04-23 NOTE — PROGRESS NOTES
Subjective:      Patient ID: Eliz Hartman is a 67 y.o. female.    Chief Complaint: Callouses (bilateral both feet  all toes Dr Jack 2017)    Eliz is a 67 y.o. female who presents to the clinic complaining of painful corn right 5th toe.  She usually gets the corn trimmed along with any other calluses on the bottom of her feet trimmed about every two months by a podiatrist in [place].   She does not get pedicures.  She is not interested in surgical correction of the right 5th toe at this time.  She would just like the corn trimmed.            Patient Active Problem List   Diagnosis    Microscopic hematuria    Chronic atrial fibrillation    Osteoporosis    Goiter, nontoxic, multinodular       Current Outpatient Prescriptions on File Prior to Visit   Medication Sig Dispense Refill    CALCIUM CARBONATE/VITAMIN D3 (CALCIUM 500 WITH D ORAL) Take 2 each by mouth once daily at 6am.      meloxicam (MOBIC) 15 MG tablet Take 1 tablet (15 mg total) by mouth once daily. 30 tablet 0    XARELTO 20 mg Tab Take 20 mg by mouth once daily.      alendronate (FOSAMAX) 70 MG tablet Take 70 mg by mouth every 7 days.       ammonium lactate 12 % Crea Apply 1 application topically once daily. 140 g 5    diclofenac sodium (VOLTAREN) 1 % Gel Apply 2 g topically 2 (two) times daily. 500 g 1    estradiol (ESTRACE) 0.01 % (0.1 mg/gram) vaginal cream Place 1 g vaginally 3 (three) times a week. Place by fingertip application before bedtime three times a week (Monday, Wednesday, Friday) 45 g 3     No current facility-administered medications on file prior to visit.        Review of patient's allergies indicates:  No Known Allergies    Past Surgical History:   Procedure Laterality Date    HAND SURGERY      OOPHORECTOMY         Family History   Problem Relation Age of Onset    Atrial fibrillation Mother     Parkinsonism Father     Atrial fibrillation Sister     Atrial fibrillation Brother        Social History     Social History     "Marital status: Single     Spouse name: N/A    Number of children: N/A    Years of education: N/A     Occupational History    Not on file.     Social History Main Topics    Smoking status: Never Smoker    Smokeless tobacco: Not on file    Alcohol use Yes    Drug use: No    Sexual activity: Not on file     Other Topics Concern    Not on file     Social History Narrative    No narrative on file           Review of Systems   Constitution: Negative for chills, decreased appetite and fever.   Cardiovascular: Negative for leg swelling.   Musculoskeletal: Negative for arthritis, joint pain, joint swelling and myalgias.   Gastrointestinal: Negative for nausea and vomiting.   Neurological: Negative for loss of balance, numbness and paresthesias.           Objective:       Vitals:    04/23/18 1602   BP: 128/76   Pulse: 63   Weight: 71.7 kg (158 lb)   Height: 5' 6" (1.676 m)   PainSc:   6        Physical Exam   Constitutional: She is oriented to person, place, and time. She appears well-developed and well-nourished.   Cardiovascular:   Pulses:       Dorsalis pedis pulses are 2+ on the right side, and 2+ on the left side.        Posterior tibial pulses are 2+ on the right side, and 2+ on the left side.   Musculoskeletal: She exhibits no edema or tenderness.        Right ankle: Normal.        Left ankle: Normal.        Right foot: There is no swelling, no crepitus and no deformity.        Left foot: There is no swelling, no crepitus and no deformity.   Adequate joint range of motion without pain, limitation, nor crepitation Bilateral feet and ankle joints. Muscle strength is 5/5 in all groups bilaterally.      1st MPJ exostosis w/ medial deviation of hallux, non trackbound. No pain w/ ROM to r hallux  5th MPJ exostosis w/ lateral deviation of 5th metatarsal  No pain w/ ROM r pain with palpation of exostosis  semi reducible IPJ digital contracture 2-4 R  Adductovarus rotation of R fifth digit        Lymphadenopathy: "   No palpable lymph nodes   Neurological: She is alert and oriented to person, place, and time. She has normal strength.   Skin: Skin is warm, dry and intact. No rash noted. No erythema. Nails show no clubbing.   Psychiatric: She has a normal mood and affect. Her behavior is normal.             Assessment:       Encounter Diagnoses   Name Primary?    Pain of toe of right foot Yes    Corn of toe - Right Foot          Plan:       Eliz was seen today for callouses.    Diagnoses and all orders for this visit:    Pain of toe of right foot    Corn of toe - Right Foot      I counseled the patient on her conditions, their implications and medical management.  [place] trimmed as a courtesy.  Continue management in [place] as she has been doing if desired.  Shoe and activity modification as needed for comfort.   She is not interested in surgical correction of adductovarus 5th toe.   RTC prn Procedure Brief for debridement of corn.

## 2018-05-24 ENCOUNTER — TELEPHONE (OUTPATIENT)
Dept: INTERNAL MEDICINE | Facility: CLINIC | Age: 68
End: 2018-05-24

## 2018-05-24 ENCOUNTER — OFFICE VISIT (OUTPATIENT)
Dept: INTERNAL MEDICINE | Facility: CLINIC | Age: 68
End: 2018-05-24
Payer: MEDICARE

## 2018-05-24 VITALS
OXYGEN SATURATION: 97 % | DIASTOLIC BLOOD PRESSURE: 82 MMHG | HEIGHT: 66 IN | BODY MASS INDEX: 25.58 KG/M2 | WEIGHT: 159.19 LBS | SYSTOLIC BLOOD PRESSURE: 116 MMHG | HEART RATE: 66 BPM

## 2018-05-24 DIAGNOSIS — Z12.31 SCREENING MAMMOGRAM, ENCOUNTER FOR: ICD-10-CM

## 2018-05-24 DIAGNOSIS — E04.2 GOITER, NONTOXIC, MULTINODULAR: ICD-10-CM

## 2018-05-24 DIAGNOSIS — R79.9 ABNORMAL FINDING OF BLOOD CHEMISTRY: ICD-10-CM

## 2018-05-24 DIAGNOSIS — Z00.00 VISIT FOR ANNUAL HEALTH EXAMINATION: Primary | ICD-10-CM

## 2018-05-24 DIAGNOSIS — I48.20 CHRONIC ATRIAL FIBRILLATION: ICD-10-CM

## 2018-05-24 DIAGNOSIS — R31.29 MICROSCOPIC HEMATURIA: ICD-10-CM

## 2018-05-24 DIAGNOSIS — M81.0 AGE RELATED OSTEOPOROSIS, UNSPECIFIED PATHOLOGICAL FRACTURE PRESENCE: ICD-10-CM

## 2018-05-24 DIAGNOSIS — Z13.220 SCREENING, LIPID: ICD-10-CM

## 2018-05-24 DIAGNOSIS — R21 RASH AND NONSPECIFIC SKIN ERUPTION: ICD-10-CM

## 2018-05-24 PROCEDURE — 99214 OFFICE O/P EST MOD 30 MIN: CPT | Mod: S$PBB,,, | Performed by: INTERNAL MEDICINE

## 2018-05-24 PROCEDURE — 99999 PR PBB SHADOW E&M-EST. PATIENT-LVL III: CPT | Mod: PBBFAC,,, | Performed by: INTERNAL MEDICINE

## 2018-05-24 PROCEDURE — G0009 ADMIN PNEUMOCOCCAL VACCINE: HCPCS | Mod: PBBFAC

## 2018-05-24 PROCEDURE — 99213 OFFICE O/P EST LOW 20 MIN: CPT | Mod: PBBFAC,25 | Performed by: INTERNAL MEDICINE

## 2018-05-24 RX ORDER — TRIAMCINOLONE ACETONIDE 1 MG/G
CREAM TOPICAL 2 TIMES DAILY
Qty: 15 G | Refills: 0 | Status: SHIPPED | OUTPATIENT
Start: 2018-05-24 | End: 2020-06-11

## 2018-05-24 NOTE — PROGRESS NOTES
"INTERNAL MEDICINE INITIAL VISIT NOTE      CHIEF COMPLAINT     Chief Complaint   Patient presents with    Annual Exam       HPI     Eliz Hartman is a 67 y.o.  female who presents with chronic A fib s/p PM, nontoxic MNG, osteoporosis, here today to re-establish care.    Was prev seen by me at [facility].  Not seen in over a year.  Records not fully available to me at this time.    Was prev on Alendronate for several months and then had repeat testing done in [facility] and was told she only had osteopenia.  Seen by Rheumatologist in [facility] who started her on Prolia and is due for next injection in July.    In Oct, had episode where she "blacked out" while standing at a cafe in [facility].  Had MRI brain which was normal and had Holter monitor which showed significant bradycardia and subsequently had PM placed s issues.  Had CV last year as well which was unsuccessful and also discussed ablation but says Cards did not think it would help.    Also sees Cards here, Dr. Silva but usually followed in [facility].        Past Medical History:  Past Medical History:   Diagnosis Date    Atrial fibrillation     Multiple thyroid nodules     Osteoporosis        Past Surgical History:  Past Surgical History:   Procedure Laterality Date    CARDIAC PACEMAKER PLACEMENT      HAND SURGERY      OOPHORECTOMY         Home Medications:  Prior to Admission medications    Medication Sig Start Date End Date Taking? Authorizing Provider   CALCIUM CARBONATE/VITAMIN D3 (CALCIUM 500 WITH D ORAL) Take 2 each by mouth once daily at 6am.   Yes Historical Provider, MD   denosumab (PROLIA SUBQ) Inject into the skin.   Yes Historical Provider, MD   XARELTO 20 mg Tab Take 20 mg by mouth once daily. 3/18/17  Yes Historical Provider, MD   ammonium lactate 12 % Crea Apply 1 application topically once daily. 7/17/17   Zahida Streeter, DPEKTA   diclofenac sodium (VOLTAREN) 1 % Gel Apply 2 g topically 2 (two) times daily. 7/18/17   Zahida Streeter, " DPM   estradiol (ESTRACE) 0.01 % (0.1 mg/gram) vaginal cream Place 1 g vaginally 3 (three) times a week. Place by fingertip application before bedtime three times a week (Monday, Wednesday, Friday) 3/20/17 4/19/17  Rosie Monaco MD   alendronate (FOSAMAX) 70 MG tablet Take 70 mg by mouth every 7 days.  4/4/17 5/24/18  Historical Provider, MD   meloxicam (MOBIC) 15 MG tablet Take 1 tablet (15 mg total) by mouth once daily. 7/20/17 5/24/18  Zahida Streeter DPM       Allergies:  Review of patient's allergies indicates:  No Known Allergies    Family History:  Family History   Problem Relation Age of Onset    Atrial fibrillation Mother     Heart failure Mother     Parkinsonism Father     Stroke Father     Atrial fibrillation Sister     Atrial fibrillation Brother        Social History:  Social History   Substance Use Topics    Smoking status: Never Smoker    Smokeless tobacco: Never Used    Alcohol use Yes      Comment: 2 glasses wine/day       Review of Systems:  Review of Systems   Constitutional: Negative for activity change and unexpected weight change.   HENT: Negative for hearing loss, rhinorrhea and trouble swallowing.    Eyes: Negative for discharge and visual disturbance.   Respiratory: Negative for chest tightness and wheezing.    Cardiovascular: Negative for chest pain and palpitations.   Gastrointestinal: Negative for blood in stool, constipation, diarrhea and vomiting.   Endocrine: Negative for polydipsia and polyuria.   Genitourinary: Negative for difficulty urinating, dysuria, hematuria and menstrual problem.   Musculoskeletal: Positive for arthralgias. Negative for joint swelling and neck pain.   Neurological: Negative for weakness and headaches.   Psychiatric/Behavioral: Negative for confusion and dysphoric mood.       Health Maintenance:   Immunizations:   Influenza is not up to date  TDap is up to date, within 10 yrs per pt  Prevnar today  Shingrix rec, out of stock today    Cancer  "Screening:  PAP: Dr. Coulter, sees annually, seen in [place] within the last year, no issues.  Mammogram: 1/2017, needs updated, will order   Colonoscopy: 5 years ago normal per pt  DEXA: 11/2017 osteopenia per pt    PHYSICAL EXAM     /82 (BP Location: Left arm, Patient Position: Sitting, BP Method: Large (Manual))   Pulse 66   Ht 5' 6" (1.676 m)   Wt 72.2 kg (159 lb 2.8 oz)   SpO2 97%   BMI 25.69 kg/m²     GEN - A+OX4, NAD   HEENT - PERRL, EOMI, OP clear  Neck - No thyromegaly or cervical LAD. No thyroid masses felt.  CV - occasional irregular beats, no m/r/g  Chest - CTAB, no wheezing, crackles, or rhonchi  Abd - S/NT/ND/+BS.   Ext - 2+BDP and radial pulses. No C/C/E.  LN - No LAD appreciated.  Skin - Normal color and texture, small areas of raised, well-defined erythema      LABS         ASSESSMENT/PLAN     Eliz Hartman is a 67 y.o. female with  Eliz was seen today for annual exam.    Diagnoses and all orders for this visit:    Visit for annual health examination  History and physical exam completed.  Health maintenance reviewed as above.    Goiter, nontoxic, multinodular  Seen by Endo here last year.  Compared old records to most recent u/s but no records of prev bx.  Pt opted to manage conservatively.  Due for repeat u/s and f/u/ c endo now, pt given info to call and schedule per her request  -     TSH; Future; Expected date: 05/24/2018    Age related osteoporosis, unspecified pathological fracture presence  As per HPI  Did not like s/e of alendronate.  Now on Prolia, pt to discuss c endo at upcoming appt, has also been seeing MD in [place]  -     denosumab (PROLIA SUBQ); Inject into the skin    Microscopic hematuria  Noted from [facility] records, seen by urology here who did cystoscopy and suspected sabiha hematuria related to vaginal atrophy and on estrace  -     CBC auto differential; Future; Expected date: 05/24/2018    Chronic atrial fibrillation  As per HPI  Has PM for hx bradycardia and syncope  Per pt, " cards did not feel ablation would be helpful  Cont f/u c cards  On xarelto for stroke prevention  -     Comprehensive metabolic panel; Future; Expected date: 05/24/2018  -     Lipid panel; Future; Expected date: 05/24/2018    Rash and nonspecific skin eruption  Unclear etiology  Looks like insect bites, sx resolved while in [place] and recurred upon arrival home  Consider derm eval if sx persist, currently has been improving  -     triamcinolone acetonide 0.1% (KENALOG) 0.1 % cream; Apply topically 2 (two) times daily.    Screening, lipid  -     Lipid panel; Future; Expected date: 05/24/2018  .  Screening mammogram, encounter for  -     Mammo Digital Screening Bilat with CAD; Future; Expected date: 05/24/2018    BMI 25.0-25.9,adult  -     Lipid panel; Future; Expected date: 05/24/2018      HM as above    RTC in 12 months, sooner if needed.  Pt to call the clinic in 2 weeks to see if shingrix back in stock and if so would like labs to be scheduled then on same day as vacc.    Yolette Jack MD  Department of Internal Medicine - [facility]  05/24/2018

## 2018-06-04 ENCOUNTER — TELEPHONE (OUTPATIENT)
Dept: INTERNAL MEDICINE | Facility: CLINIC | Age: 68
End: 2018-06-04

## 2018-06-04 ENCOUNTER — TELEPHONE (OUTPATIENT)
Dept: ENDOCRINOLOGY | Facility: CLINIC | Age: 68
End: 2018-06-04

## 2018-06-04 NOTE — TELEPHONE ENCOUNTER
----- Message from Yessenia Hidalgo sent at 6/4/2018 10:17 AM CDT -----  Contact: [phone]  Patient requesting a call from the office to discuss having  New shingles injection done . Please call and advise, Thanks

## 2018-06-04 NOTE — TELEPHONE ENCOUNTER
Left voicemail message that November schedules aren't open yet, they will open around July 1 or 2. If she wants to schedule sooner in October she is welcome to call me back and provided call back number.

## 2018-06-04 NOTE — TELEPHONE ENCOUNTER
----- Message from Keesha Marte sent at 6/4/2018 10:35 AM CDT -----  Contact: Eliz    tel:   [phone]   Needs Advice    Reason for call:       Wants specific date  in November for a f/u and time.  (no access)  Communication Preference:   Phone   Additional Information:  Wants to speak to someone about this.

## 2018-06-13 ENCOUNTER — TELEPHONE (OUTPATIENT)
Dept: INTERNAL MEDICINE | Facility: CLINIC | Age: 68
End: 2018-06-13

## 2018-06-13 NOTE — TELEPHONE ENCOUNTER
----- Message from Cherrie Evans sent at 6/13/2018 12:25 PM CDT -----  Contact: self  Patient states need to speak with nurse has question regarding fasting lab.   Please call pt at [phone]

## 2018-06-15 ENCOUNTER — LAB VISIT (OUTPATIENT)
Dept: LAB | Facility: HOSPITAL | Age: 68
End: 2018-06-15
Attending: INTERNAL MEDICINE
Payer: MEDICARE

## 2018-06-15 DIAGNOSIS — I48.20 CHRONIC ATRIAL FIBRILLATION: ICD-10-CM

## 2018-06-15 DIAGNOSIS — R79.9 ABNORMAL FINDING OF BLOOD CHEMISTRY: ICD-10-CM

## 2018-06-15 DIAGNOSIS — R31.29 MICROSCOPIC HEMATURIA: ICD-10-CM

## 2018-06-15 DIAGNOSIS — Z13.220 SCREENING, LIPID: ICD-10-CM

## 2018-06-15 DIAGNOSIS — E04.2 GOITER, NONTOXIC, MULTINODULAR: ICD-10-CM

## 2018-06-15 LAB
ALBUMIN SERPL BCP-MCNC: 3.6 G/DL
ALP SERPL-CCNC: 47 U/L
ALT SERPL W/O P-5'-P-CCNC: 15 U/L
ANION GAP SERPL CALC-SCNC: 8 MMOL/L
AST SERPL-CCNC: 24 U/L
BASOPHILS # BLD AUTO: 0.02 K/UL
BASOPHILS NFR BLD: 0.5 %
BILIRUB SERPL-MCNC: 0.5 MG/DL
BUN SERPL-MCNC: 18 MG/DL
CALCIUM SERPL-MCNC: 9.3 MG/DL
CHLORIDE SERPL-SCNC: 106 MMOL/L
CHOLEST SERPL-MCNC: 196 MG/DL
CHOLEST/HDLC SERPL: 2.5 {RATIO}
CO2 SERPL-SCNC: 28 MMOL/L
CREAT SERPL-MCNC: 0.8 MG/DL
DIFFERENTIAL METHOD: ABNORMAL
EOSINOPHIL # BLD AUTO: 0.1 K/UL
EOSINOPHIL NFR BLD: 1.4 %
ERYTHROCYTE [DISTWIDTH] IN BLOOD BY AUTOMATED COUNT: 16 %
EST. GFR  (AFRICAN AMERICAN): >60 ML/MIN/1.73 M^2
EST. GFR  (NON AFRICAN AMERICAN): >60 ML/MIN/1.73 M^2
GLUCOSE SERPL-MCNC: 94 MG/DL
HCT VFR BLD AUTO: 41.7 %
HDLC SERPL-MCNC: 78 MG/DL
HDLC SERPL: 39.8 %
HGB BLD-MCNC: 13.8 G/DL
LDLC SERPL CALC-MCNC: 104.8 MG/DL
LYMPHOCYTES # BLD AUTO: 1.8 K/UL
LYMPHOCYTES NFR BLD: 39.6 %
MCH RBC QN AUTO: 33.1 PG
MCHC RBC AUTO-ENTMCNC: 33.1 G/DL
MCV RBC AUTO: 100 FL
MONOCYTES # BLD AUTO: 0.5 K/UL
MONOCYTES NFR BLD: 12.2 %
NEUTROPHILS # BLD AUTO: 2.1 K/UL
NEUTROPHILS NFR BLD: 46.3 %
NONHDLC SERPL-MCNC: 118 MG/DL
PLATELET # BLD AUTO: 207 K/UL
PMV BLD AUTO: 10.3 FL
POTASSIUM SERPL-SCNC: 4.8 MMOL/L
PROT SERPL-MCNC: 6.4 G/DL
RBC # BLD AUTO: 4.17 M/UL
SODIUM SERPL-SCNC: 142 MMOL/L
TRIGL SERPL-MCNC: 66 MG/DL
TSH SERPL DL<=0.005 MIU/L-ACNC: 1 UIU/ML
WBC # BLD AUTO: 4.44 K/UL

## 2018-06-15 PROCEDURE — 84443 ASSAY THYROID STIM HORMONE: CPT

## 2018-06-15 PROCEDURE — 80061 LIPID PANEL: CPT

## 2018-06-15 PROCEDURE — 36415 COLL VENOUS BLD VENIPUNCTURE: CPT

## 2018-06-15 PROCEDURE — 80053 COMPREHEN METABOLIC PANEL: CPT

## 2018-06-15 PROCEDURE — 85025 COMPLETE CBC W/AUTO DIFF WBC: CPT

## 2018-06-16 ENCOUNTER — PATIENT MESSAGE (OUTPATIENT)
Dept: INTERNAL MEDICINE | Facility: CLINIC | Age: 68
End: 2018-06-16

## 2018-06-18 NOTE — TELEPHONE ENCOUNTER
Pt is leaving for [facility] on 6/19, requesting labs results before the 19th at noon.

## 2018-08-04 ENCOUNTER — PATIENT MESSAGE (OUTPATIENT)
Dept: INTERNAL MEDICINE | Facility: CLINIC | Age: 68
End: 2018-08-04

## 2018-08-04 DIAGNOSIS — M85.80 OSTEOPENIA, UNSPECIFIED LOCATION: Primary | ICD-10-CM

## 2018-08-04 DIAGNOSIS — Z00.00 VISIT FOR ANNUAL HEALTH EXAMINATION: ICD-10-CM

## 2018-08-04 DIAGNOSIS — M85.9 DISORDER OF BONE DENSITY AND STRUCTURE, UNSPECIFIED: ICD-10-CM

## 2018-08-04 DIAGNOSIS — M85.89 OTHER SPECIFIED DISORDERS OF BONE DENSITY AND STRUCTURE, MULTIPLE SITES: ICD-10-CM

## 2018-08-06 ENCOUNTER — PATIENT MESSAGE (OUTPATIENT)
Dept: INTERNAL MEDICINE | Facility: CLINIC | Age: 68
End: 2018-08-06

## 2018-10-03 ENCOUNTER — HOSPITAL ENCOUNTER (OUTPATIENT)
Dept: RADIOLOGY | Facility: CLINIC | Age: 68
Discharge: HOME | End: 2018-10-03
Attending: INTERNAL MEDICINE
Payer: MEDICARE

## 2018-10-03 DIAGNOSIS — M85.89 OTHER SPECIFIED DISORDERS OF BONE DENSITY AND STRUCTURE, MULTIPLE SITES: ICD-10-CM

## 2018-10-03 DIAGNOSIS — M85.80 OSTEOPENIA, UNSPECIFIED LOCATION: ICD-10-CM

## 2018-10-03 DIAGNOSIS — M85.9 DISORDER OF BONE DENSITY AND STRUCTURE, UNSPECIFIED: ICD-10-CM

## 2018-10-03 PROCEDURE — 77080 DXA BONE DENSITY AXIAL: CPT | Mod: TC

## 2018-10-03 PROCEDURE — 77080 DXA BONE DENSITY AXIAL: CPT | Mod: 26,,, | Performed by: INTERNAL MEDICINE

## 2018-10-12 ENCOUNTER — OFFICE VISIT (OUTPATIENT)
Dept: OBSTETRICS AND GYNECOLOGY | Facility: CLINIC | Age: 68
End: 2018-10-12
Payer: MEDICARE

## 2018-10-12 VITALS
BODY MASS INDEX: 26.35 KG/M2 | WEIGHT: 163.94 LBS | HEIGHT: 66 IN | DIASTOLIC BLOOD PRESSURE: 74 MMHG | SYSTOLIC BLOOD PRESSURE: 118 MMHG

## 2018-10-12 DIAGNOSIS — M85.80 OSTEOPENIA, UNSPECIFIED LOCATION: ICD-10-CM

## 2018-10-12 DIAGNOSIS — Z01.419 ENCOUNTER FOR ANNUAL ROUTINE GYNECOLOGICAL EXAMINATION: Primary | ICD-10-CM

## 2018-10-12 PROCEDURE — 99999 PR PBB SHADOW E&M-EST. PATIENT-LVL II: CPT | Mod: PBBFAC,,, | Performed by: OBSTETRICS & GYNECOLOGY

## 2018-10-12 PROCEDURE — 99212 OFFICE O/P EST SF 10 MIN: CPT | Mod: PBBFAC | Performed by: OBSTETRICS & GYNECOLOGY

## 2018-10-12 PROCEDURE — G0101 CA SCREEN;PELVIC/BREAST EXAM: HCPCS | Mod: S$PBB,,, | Performed by: OBSTETRICS & GYNECOLOGY

## 2018-10-12 NOTE — PROGRESS NOTES
"Chief Complaint: Well Woman Exam     HPI:      Eliz Hartman is a 68 y.o.  who presents today for well woman exam.  LMP: No LMP recorded. Patient is postmenopausal.  No issues, problems, or complaints. Specifically, patient denies abnormal vaginal bleeding, discharge, pelvic pain, urinary problems, or changes in appetite. Ms. Hartman is not currently sexually active. Took Prolia for 2 injections.       Previous Pap:  Has never had an abnormal and states was up to date until a few years ago.   Previous Mammogram:  Negative per patient 2 years ago.  Most Recent Dexa: Osteopenia    Ms. Hartman confirms that she wears her seatbelt when riding in the car and does not text while driving.     OB History     No data available          ROS:     GENERAL: Denies unintentional weight gain or weight loss. Feeling well overall.   SKIN: Denies rash or lesions.   HEENT: Denies headaches, or vision changes.   CARDIOVASCULAR: Denies palpitations or chest pain.   RESPIRATORY: Denies shortness of breath or dyspnea on exertion.  BREASTS: Denies pain, lumps, or nipple discharge.   ABDOMEN: Denies abdominal pain, constipation, diarrhea, nausea, vomiting, change in appetite.  URINARY: Denies frequency, dysuria, hematuria.  NEUROLOGIC: Denies syncope or weakness.   PSYCHIATRIC: Denies depression, anxiety or mood swings.    Physical Exam:      PHYSICAL EXAM:  /74   Ht 5' 6" (1.676 m)   Wt 74.3 kg (163 lb 14.6 oz)   BMI 26.46 kg/m²   Body mass index is 26.46 kg/m².     APPEARANCE: Well nourished, well developed, in no acute distress.  PSYCH: Appropriate mood and affect.  SKIN: No acne or hirsutism  NECK: Neck symmetric without masses or thyromegaly  NODES: No inguinal, axillary, or supraclavicular lymph node enlargement  ABDOMEN: Soft.  No tenderness or masses.    CARDIOVASCULAR: No edema of peripheral extremities  BREASTS: Symmetrical, no skin changes or visible lesions.  No palpable masses or nipple discharge bilaterally.  PELVIC: " Normal external genitalia without lesions.  Normal hair distribution.  Adequate perineal body, normal urethral meatus.  Vagina moist and smooth without lesions or discharge.  Cervix pink, without lesions, discharge or tenderness.  No significant cystocele or rectocele.  Bimanual exam shows uterus to be normal size, regular, mobile and nontender.  Adnexa without masses or tenderness.      Assessment/Plan:     Encounter for annual routine gynecological examination  - No longer needs pap smears.     Osteopenia, unspecified location  - No need for osteoporosis pharmacotherapy at this point.   - Recommend continued calcium and vitamin D.  - Recommend increased weight bearing exercise.   - Repeat DEXA in 2 years.     Counseling:     Patient was counseled today on current ASCCP pap guidelines, the recommendation for yearly pelvic exams, healthy diet and exercise routines, breast self awareness and annual mammograms.She is to see her PCP for other health maintenance.     Use of the [employer] Patient Portal discussed and encouraged during today's visit.

## 2018-11-08 ENCOUNTER — TELEPHONE (OUTPATIENT)
Dept: INTERNAL MEDICINE | Facility: CLINIC | Age: 68
End: 2018-11-08

## 2018-11-08 NOTE — TELEPHONE ENCOUNTER
----- Message from Yanely Rosen sent at 11/8/2018 12:06 PM CST -----  Contact: Pt  Pt is requesting a callback from office says she is having a horrible cough and is requesting to be seen by her PCP only before 11/27    Pt can be reached at [phone]    Thanks

## 2018-11-09 ENCOUNTER — OFFICE VISIT (OUTPATIENT)
Dept: INTERNAL MEDICINE | Facility: CLINIC | Age: 68
End: 2018-11-09
Payer: MEDICARE

## 2018-11-09 ENCOUNTER — HOSPITAL ENCOUNTER (OUTPATIENT)
Dept: RADIOLOGY | Facility: HOSPITAL | Age: 68
Discharge: HOME | End: 2018-11-09
Attending: NURSE PRACTITIONER
Payer: MEDICARE

## 2018-11-09 ENCOUNTER — TELEPHONE (OUTPATIENT)
Dept: INTERNAL MEDICINE | Facility: CLINIC | Age: 68
End: 2018-11-09

## 2018-11-09 VITALS
OXYGEN SATURATION: 97 % | HEIGHT: 66 IN | TEMPERATURE: 98 F | HEART RATE: 69 BPM | DIASTOLIC BLOOD PRESSURE: 72 MMHG | BODY MASS INDEX: 25.83 KG/M2 | WEIGHT: 160.69 LBS | SYSTOLIC BLOOD PRESSURE: 118 MMHG

## 2018-11-09 DIAGNOSIS — R53.83 FATIGUE, UNSPECIFIED TYPE: ICD-10-CM

## 2018-11-09 DIAGNOSIS — R05.9 COUGH: Primary | ICD-10-CM

## 2018-11-09 DIAGNOSIS — R05.9 COUGH: ICD-10-CM

## 2018-11-09 PROCEDURE — 99214 OFFICE O/P EST MOD 30 MIN: CPT | Mod: PBBFAC,25 | Performed by: NURSE PRACTITIONER

## 2018-11-09 PROCEDURE — 99999 PR PBB SHADOW E&M-EST. PATIENT-LVL IV: CPT | Mod: PBBFAC,,, | Performed by: NURSE PRACTITIONER

## 2018-11-09 PROCEDURE — 71046 X-RAY EXAM CHEST 2 VIEWS: CPT | Mod: 26,,, | Performed by: RADIOLOGY

## 2018-11-09 PROCEDURE — 99213 OFFICE O/P EST LOW 20 MIN: CPT | Mod: S$PBB,,, | Performed by: NURSE PRACTITIONER

## 2018-11-09 PROCEDURE — 71046 X-RAY EXAM CHEST 2 VIEWS: CPT | Mod: TC

## 2018-11-09 RX ORDER — CODEINE PHOSPHATE AND GUAIFENESIN 10; 100 MG/5ML; MG/5ML
5 SOLUTION ORAL 3 TIMES DAILY PRN
Qty: 118 ML | Refills: 0 | Status: SHIPPED | OUTPATIENT
Start: 2018-11-09 | End: 2018-11-19

## 2018-11-09 RX ORDER — BENZONATATE 100 MG/1
100 CAPSULE ORAL 3 TIMES DAILY PRN
Qty: 30 CAPSULE | Refills: 0 | Status: SHIPPED | OUTPATIENT
Start: 2018-11-09 | End: 2018-11-19

## 2018-11-22 ENCOUNTER — PATIENT MESSAGE (OUTPATIENT)
Dept: INTERNAL MEDICINE | Facility: CLINIC | Age: 68
End: 2018-11-22

## 2018-11-23 ENCOUNTER — TELEPHONE (OUTPATIENT)
Dept: INTERNAL MEDICINE | Facility: CLINIC | Age: 68
End: 2018-11-23

## 2018-11-23 RX ORDER — BENZONATATE 100 MG/1
100 CAPSULE ORAL 3 TIMES DAILY PRN
Qty: 30 CAPSULE | Refills: 0 | Status: SHIPPED | OUTPATIENT
Start: 2018-11-23 | End: 2018-12-03

## 2019-02-14 ENCOUNTER — PATIENT MESSAGE (OUTPATIENT)
Dept: INTERNAL MEDICINE | Facility: CLINIC | Age: 69
End: 2019-02-14

## 2019-02-18 ENCOUNTER — PATIENT MESSAGE (OUTPATIENT)
Dept: INTERNAL MEDICINE | Facility: CLINIC | Age: 69
End: 2019-02-18

## 2019-02-25 ENCOUNTER — HOSPITAL ENCOUNTER (OUTPATIENT)
Dept: RADIOLOGY | Facility: HOSPITAL | Age: 69
Discharge: HOME | End: 2019-02-25
Attending: INTERNAL MEDICINE
Payer: MEDICARE

## 2019-02-25 ENCOUNTER — OFFICE VISIT (OUTPATIENT)
Dept: INTERNAL MEDICINE | Facility: CLINIC | Age: 69
End: 2019-02-25
Payer: MEDICARE

## 2019-02-25 VITALS
HEART RATE: 64 BPM | WEIGHT: 164 LBS | BODY MASS INDEX: 26.36 KG/M2 | OXYGEN SATURATION: 99 % | SYSTOLIC BLOOD PRESSURE: 118 MMHG | HEIGHT: 66 IN | DIASTOLIC BLOOD PRESSURE: 66 MMHG

## 2019-02-25 DIAGNOSIS — G89.29 CHRONIC BILATERAL LOW BACK PAIN WITH BILATERAL SCIATICA: ICD-10-CM

## 2019-02-25 DIAGNOSIS — M54.50 LOW BACK PAIN, NON-SPECIFIC: ICD-10-CM

## 2019-02-25 DIAGNOSIS — M54.41 CHRONIC BILATERAL LOW BACK PAIN WITH BILATERAL SCIATICA: Primary | ICD-10-CM

## 2019-02-25 DIAGNOSIS — M54.42 CHRONIC BILATERAL LOW BACK PAIN WITH BILATERAL SCIATICA: Primary | ICD-10-CM

## 2019-02-25 DIAGNOSIS — M54.42 CHRONIC BILATERAL LOW BACK PAIN WITH BILATERAL SCIATICA: ICD-10-CM

## 2019-02-25 DIAGNOSIS — G89.29 CHRONIC BILATERAL LOW BACK PAIN WITH BILATERAL SCIATICA: Primary | ICD-10-CM

## 2019-02-25 DIAGNOSIS — M54.41 CHRONIC BILATERAL LOW BACK PAIN WITH BILATERAL SCIATICA: ICD-10-CM

## 2019-02-25 PROCEDURE — 72100 X-RAY EXAM L-S SPINE 2/3 VWS: CPT | Mod: TC

## 2019-02-25 PROCEDURE — 99214 OFFICE O/P EST MOD 30 MIN: CPT | Mod: S$PBB,,, | Performed by: INTERNAL MEDICINE

## 2019-02-25 PROCEDURE — 99999 PR PBB SHADOW E&M-EST. PATIENT-LVL IV: CPT | Mod: PBBFAC,,, | Performed by: INTERNAL MEDICINE

## 2019-02-25 PROCEDURE — 72100 X-RAY EXAM L-S SPINE 2/3 VWS: CPT | Mod: 26,,, | Performed by: RADIOLOGY

## 2019-02-25 PROCEDURE — 72100 XR LUMBAR SPINE AP AND LATERAL: ICD-10-PCS | Mod: 26,,, | Performed by: RADIOLOGY

## 2019-02-25 PROCEDURE — 99999 PR PBB SHADOW E&M-EST. PATIENT-LVL IV: ICD-10-PCS | Mod: PBBFAC,,, | Performed by: INTERNAL MEDICINE

## 2019-02-25 PROCEDURE — 99214 PR OFFICE/OUTPT VISIT, EST, LEVL IV, 30-39 MIN: ICD-10-PCS | Mod: S$PBB,,, | Performed by: INTERNAL MEDICINE

## 2019-02-25 PROCEDURE — 99214 OFFICE O/P EST MOD 30 MIN: CPT | Mod: PBBFAC,25 | Performed by: INTERNAL MEDICINE

## 2019-02-25 RX ORDER — METHYLPREDNISOLONE 4 MG/1
TABLET ORAL
Qty: 1 PACKAGE | Refills: 0 | Status: SHIPPED | OUTPATIENT
Start: 2019-02-25 | End: 2019-03-18

## 2019-02-25 NOTE — PROGRESS NOTES
INTERNAL MEDICINE ESTABLISHED PATIENT VISIT NOTE    Subjective:     Chief Complaint: Hip Pain and Leg Pain  for several months     Patient ID: Eliz Hartman is a 68 y.o. female with chronic A fib s/p DCCV in the past, hx bradycardia c syncope s/p PM, nontoxic MNG, osteoporosis, sabiha hematuria (seen by urology who did cysto, suspected due to vaginal atrophy and on estrace),  last seen by me 5/2018, here today for focused visit for c/o hip and leg pain.    To review, regarding osteoporosis, reported prior dexa done in [place] c osteopenia and was getting Prolia injections for a year as she did not like the s/e of alendronate.  Had osteopenia on dexa here in Oct and gyn rec to repeat DEXA in 2 years.    Says she has been having low back pain across B lower back and also c pain extending down her legs B, R>L.  Also c gluteal pain at times.  No numbness or weakness.   Sx worse when going up stairs. Sometimes feels like a soreness, sometimes feels like a stabbing pain.  Sx worse in AM and improve progressively throughout the day.    Past Medical History:  Past Medical History:   Diagnosis Date    Atrial fibrillation     Multiple thyroid nodules     Osteoporosis     Pacemaker        Home Medications:  Prior to Admission medications    Medication Sig Start Date End Date Taking? Authorizing Provider   ammonium lactate 12 % Crea Apply 1 application topically once daily. 7/17/17  Yes Zahida Streeter DPM   CALCIUM CARBONATE/VITAMIN D3 (CALCIUM 500 WITH D ORAL) Take 2 each by mouth once daily at 6am.   Yes Historical Provider, MD   diclofenac sodium (VOLTAREN) 1 % Gel Apply 2 g topically 2 (two) times daily. 7/18/17  Yes Zahida Streeter DPM   multivitamin capsule Take 1 capsule by mouth once daily.   Yes Historical Provider, MD   XARELTO 20 mg Tab Take 20 mg by mouth once daily. 3/18/17  Yes Historical Provider, MD   estradiol (ESTRACE) 0.01 % (0.1 mg/gram) vaginal cream Place 1 g vaginally 3 (three) times a  week. Place by fingertip application before bedtime three times a week (Monday, Wednesday, Friday) 3/20/17 4/19/17  Rosie Monaco MD   triamcinolone acetonide 0.1% (KENALOG) 0.1 % cream Apply topically 2 (two) times daily. 5/24/18 6/3/18  Yolette Jack MD   denosumab (PROLIA SUBQ) Inject into the skin.  2/25/19  Historical Provider, MD       Allergies:  Review of patient's allergies indicates:  No Known Allergies    Social History:  Social History     Tobacco Use    Smoking status: Never Smoker    Smokeless tobacco: Never Used   Substance Use Topics    Alcohol use: Yes     Comment: 2 glasses wine/day    Drug use: No        Review of Systems   Constitutional: Negative for activity change and unexpected weight change.   HENT: Negative for hearing loss, rhinorrhea and trouble swallowing.    Eyes: Negative for discharge and visual disturbance.   Respiratory: Negative for chest tightness and wheezing.    Cardiovascular: Negative for chest pain and palpitations.   Gastrointestinal: Negative for blood in stool, constipation, diarrhea and vomiting.   Endocrine: Negative for polydipsia and polyuria.   Genitourinary: Negative for difficulty urinating, dysuria, hematuria and menstrual problem.   Musculoskeletal: Positive for arthralgias and back pain. Negative for joint swelling and neck pain.   Neurological: Negative for weakness and headaches.   Psychiatric/Behavioral: Negative for confusion and dysphoric mood.         Health Maintenance:     NOT ADDRESSED TODAY  Immunizations:   Influenza is not up to date  TDap is up to date, within 10 yrs per pt  Prevnar today  Shingrix rec, out of stock today     Cancer Screening:  PAP: Dr. Coutler, sees annually, seen in [place] within the last year, no issues.  Mammogram: 1/2017, needs updated, will order   Colonoscopy: 5 years ago normal per pt  DEXA: 11/2017 osteopenia per pt      Objective:   /66 (BP Location: Left arm, Patient Position: Sitting, BP Method: Medium (Manual))  "  Pulse 64   Ht 5' 6" (1.676 m)   Wt 74.4 kg (164 lb 0.4 oz)   SpO2 99%   BMI 26.47 kg/m²        General: AAO x3, no apparent distress  CV: RRR, no m/r/g  Pulm: Lungs CTAB, no crackles, no wheezes  Abd: s/NT/ND +BS  Extremities: no c/c/e  Back: normal ROM, no ttp over lower back, no muscle spasms noted  SLR neg B  ROM hip wnl s pain.  Mild crepitus on R knee, no swelling.    Labs:         Assessment/Plan     Eliz was seen today for hip pain and leg pain.    Diagnoses and all orders for this visit:    Chronic bilateral low back pain with bilateral sciatica  Suspect cause of B leg pain based on distribution and c concurrent back pain.  rec stretching exercises or yoga  Can take tylenol prn (avoiding NSAIDS due to currently being on Xarelto)  Consider gabapentin if needed in the future.  -     X-Ray Lumbar Spine Ap And Lateral; Future  -     methylPREDNISolone (MEDROL DOSEPACK) 4 mg tablet; use as directed  -     Ambulatory consult to Physical Therapy      HM as above  RTC in 3 mos for f/u, can contact me sooner if spine referral needed if sx fail to improve.    Yolette Jack MD  Department of Internal Medicine - [facility]  02/25/2019  "

## 2019-02-26 ENCOUNTER — PATIENT MESSAGE (OUTPATIENT)
Dept: INTERNAL MEDICINE | Facility: CLINIC | Age: 69
End: 2019-02-26

## 2019-02-27 ENCOUNTER — PATIENT MESSAGE (OUTPATIENT)
Dept: INTERNAL MEDICINE | Facility: CLINIC | Age: 69
End: 2019-02-27

## 2019-02-27 NOTE — TELEPHONE ENCOUNTER
Spoke with pt and she only wants to talk to you about medrol dose pack. Pt is ok waiting until next Wednesday to talk to you.

## 2019-02-28 ENCOUNTER — CLINICAL SUPPORT (OUTPATIENT)
Dept: REHABILITATION | Facility: OTHER | Age: 69
End: 2019-02-28
Payer: MEDICARE

## 2019-02-28 DIAGNOSIS — Z74.09 DECREASED STRENGTH, ENDURANCE, AND MOBILITY: ICD-10-CM

## 2019-02-28 DIAGNOSIS — G89.29 CHRONIC BILATERAL LOW BACK PAIN WITH BILATERAL SCIATICA: ICD-10-CM

## 2019-02-28 DIAGNOSIS — M54.42 CHRONIC BILATERAL LOW BACK PAIN WITH BILATERAL SCIATICA: ICD-10-CM

## 2019-02-28 DIAGNOSIS — R53.1 DECREASED STRENGTH, ENDURANCE, AND MOBILITY: ICD-10-CM

## 2019-02-28 DIAGNOSIS — M54.41 CHRONIC BILATERAL LOW BACK PAIN WITH BILATERAL SCIATICA: ICD-10-CM

## 2019-02-28 DIAGNOSIS — R68.89 DECREASED STRENGTH, ENDURANCE, AND MOBILITY: ICD-10-CM

## 2019-02-28 PROCEDURE — G8978 MOBILITY CURRENT STATUS: HCPCS | Mod: CJ,PN

## 2019-02-28 PROCEDURE — 97110 THERAPEUTIC EXERCISES: CPT | Mod: PN

## 2019-02-28 PROCEDURE — G8979 MOBILITY GOAL STATUS: HCPCS | Mod: CI,PN

## 2019-02-28 PROCEDURE — 97161 PT EVAL LOW COMPLEX 20 MIN: CPT | Mod: PN

## 2019-02-28 PROCEDURE — 97140 MANUAL THERAPY 1/> REGIONS: CPT | Mod: PN

## 2019-02-28 NOTE — PLAN OF CARE
"[facility]  Physical Therapy Initial Evaluation    Name: Eliz Hartman  Clinic Number: [id number]    Therapy Diagnosis:   Encounter Diagnoses   Name Primary?    Chronic bilateral low back pain with bilateral sciatica     Decreased strength, endurance, and mobility       Physician: Yolette Jack MD    Physician Orders: PT Eval and Treat   Medical Diagnosis from Referral: M54.42,M54.41,G89.29 (ICD-10-CM) - Chronic bilateral low back pain with bilateral sciatica  Evaluation Date: 2/28/2019  Authorization Period Expiration: 02/25/2020  Plan of Care Expiration: 05/28/2019  Visit # / Visits authorized: 1/ 1    Time In: 1:00  Time Out: 2:00  Total Billable Time: 60 minutes    Precautions: Standard, osteoporosis, pacemaker, Afib    Subjective   Date of onset: ! year  History of current condition - Eliz reports: insidious onset of low back pain and stiffness that has been present for "a long time" with new development of bilateral thigh pain a few months ago.  She notes that the low back, R>L, is constantly stiff, worse in the AM and works itself out as the day progresses. Her thigh pain is anterior and lateral, goes to the knee but rarely goes below it. Denies radicular symptoms, numbness or tingling, B/B changes, or saddle paresthesias. Prolonged sitting makes her back worse; stair climbing and hiking makes the leg pain worse.    Medical History:   Past Medical History:   Diagnosis Date    Atrial fibrillation     Multiple thyroid nodules     Osteoporosis     Pacemaker        Surgical History:   Eliz Hartman  has a past surgical history that includes Hand surgery; Cardiac pacemaker placement; and Oophorectomy.    Medications:   Eliz has a current medication list which includes the following prescription(s): ammonium lactate, calcium carbonate/vitamin d3, diclofenac sodium, estradiol, methylprednisolone, multivitamin, triamcinolone acetonide 0.1%, and xarelto.    Allergies:   Review of " "patient's allergies indicates:  No Known Allergies     Imaging, Xray of LSP on 2/25/2019: Impression - Lumbar spondylosis as above.  Significant overlying bowel gas on lateral views, which limits detail for evaluation of a lytic process.  If concern for underlying permeative lesion, consider repeat lumbar radiograph in 1-2 weeks.    Prior Therapy: none for c/c  Social History: elevated home (4 steps to front door), 2 story home  Occupation: retired. Recreational - walking, exercise, hiking, tennis  Prior Level of Function: independent with all functional activities  Current Level of Function: pain and difficulty with transitional movements, stair climbing, recreational activities    Pain:  Current 3/10, worst 6/10, best 3/10   Location: bilateral low back and LE (anterio-lateral thigh)   Description: Aching, Dull and tight, stiff  Aggravating Factors: Sitting, Standing, Walking, Getting out of bed/chair and initiating walking after prolonged sitting, driving, stair climbing (ascending > descending)   Easing Factors: nothing - denies use of modalities, stretching    Pts goals: "reduce pain so that I can hike and climb the stairs at home"    Objective     Postural examination/scapula alignment: Rounded shoulder and Decreased lordosis, decreased glute tone  Sensory deficit: none  Reflexes: 2+    Thoracic/Lumbar AROM: Pain/Dysfunction with Movement:   Flexion WFL, no pain, tension in B HS, decreased lumbar curvature   Extension Mod kuhn, ERP, poor lumbar lordosis   Right side bending WFL, no pain   Left side bending Min kuhn, c/o stiffness   Right rotation Min kuhn (sitting and standing), no pain   Left rotation WFL, no pain     Hip ROM: WNL, IR = Min kuhn (RLE)  Knee ROM: WNL  Lower Extremity Strength  Right LE  Left LE    Hip flexion: 4+/5 Hip flexion: 4+/5   Hip extension:  4+/5 Hip extension: 4+/5   Hip abduction: 4/5 Hip abduction: 4/5   Hip adduction:  5/5 Hip adduction:  5/5   Hip Internal rotation   5/5 Hip " "Internal rotation 5/5   Knee Flexion 5/5 Knee Flexion 5/5   Knee Extension 5/5 Knee Extension 5/5   Ankle dorsiflexion: 5/5 Ankle dorsiflexion: 5/5   Ankle plantarflexion: 5/5 Ankle plantarflexion: 5/5     Flexibility:   Chirag test   Hip flexors: fair   Quads: fair  Jarocho test   ITB: NT  Hamstring (SLR): fair+ (<80 deg)    Joint Mobility: L2-S1 RR = 2/6    Special Tests:   Test Name  Test Result   Prone Instability Test (--)   Lumbar Quadrant test (--)   Straight Leg Raise (--)   Neural Tension Test (Slump) (--)   Crossed Straight Leg Raise (--)   Walking on toes (--)   Walking on heels  (--)   Clonus (--)   VISHAL (--)   FADIR (--)   SI Joint Provocation Test (compression / distraction) NT     Functional Movement Analysis:   Gait: I  Transfers: I  Bed mobility: I  Squat: not to 90 deg, increased trunk flex to thighs, Tod toe out  Balance: SLS <5" before sway and LOB          CMS Impairment/Limitation/Restriction for FOTO Lumbar spine Survey    Therapist reviewed FOTO scores for Eliz Hartman on 2/28/2019.   FOTO documents entered into [employer] - see Media section.    Limitation Score: 31%  Category: Mobility    Current : CJ = at least 20% but < 40% impaired, limited or restricted  Goal: CI = at least 1% but < 20% impaired, limited or restricted  Discharge: N/A         TREATMENT   Treatment Time In: 1:30  Treatment Time Out: 2:00  Total Treatment time separate from Evaluation: 30 minutes    Eliz received therapeutic exercises to develop strength, endurance, ROM, flexibility, posture and core stabilization for 15 minutes including:  Pretzle stretch 3 x 20"  Figure 4 piriformis stretch 3 x 20"  SL hip abd 2 x 15    Eliz received the following manual therapy techniques: Joint mobilizations, Myofacial release and Soft tissue Mobilization were applied to the: low back for 10 minutes.    Home Exercises and Patient Education Provided    Education provided:   - Patient educated regarding pathogenesis, diagnosis, protocol, " prognosis, POC, and HEP. Written Home Exercises Provided with written and verbal instructions for frequency and duration of the following exercises: see EMR. Pt educated on HEP and activity modifications to reduce c/o pain and improve overall function.   - Pt was educated in posture and body mechanics.  Use of a lumbar roll was recommended d demonstrated here today.  Purchase information provided.   - Pt also educated on use of modalities prn to reduce c/o pain and dysfunction.   - Pt educated on clinic's cancellation/no-show policy of missing 3 consecutive PT appointments, which will result in an automatic discharge from therapy services 2* to non-compliance, unless otherwise stated.   - Patient demo good understanding of the education provided. Patient demo good return demo of skill of exercises.        Written Home Exercises Provided: Patient instructed to cont prior HEP.  Exercises were reviewed and Eliz was able to demonstrate them prior to the end of the session.  Eliz demonstrated good  understanding of the education provided.     See EMR under Patient Instructions for exercises provided 2/28/2019.    Assessment   Eliz is a 68 y.o. female referred to outpatient Physical Therapy with a medical diagnosis of chronic bilateral low back pain with bilateral leg pain. Pt presents with significant limitations in joint mobility, ROM, flexibility and strength that limit dynamic lumbopelvic stability with ADLs.    Pt prognosis is Good.   Pt will benefit from skilled outpatient Physical Therapy to address the deficits stated above and in the chart below, provide pt/family education, and to maximize pt's level of independence.     Plan of care discussed with patient: Yes  Pt's spiritual, cultural and educational needs considered and patient is agreeable to the plan of care and goals as stated below:     Anticipated Barriers for therapy: limited availability to attend PT as [place] lives in [place] 50%, [place] 50% and has several  month long vacations scheduled through the next several months.    Medical Necessity is demonstrated by the following  History  Co-morbidities and personal factors that may impact the plan of care Co-morbidities:   advanced age, osteoporosis    Personal Factors:   age  social background     low   Examination  Body Structures and Functions, activity limitations and participation restrictions that may impact the plan of care Body Regions:   back  lower extremities  trunk    Body Systems:    gross symmetry  ROM  strength  gross coordinated movement  balance  gait  transfers  transitions  motor control  motor learning  flexibility, joint mobility    Participation Restrictions:   ADL, HHCs, stair climbing    Activity limitations:   Learning and applying knowledge  no deficits    General Tasks and Commands  no deficits    Communication  no deficits    Mobility  lifting and carrying objects  walking  driving (bike, car, motorcycle)  stair climbing    Self care  looking after one's health    Domestic Life  shopping  cooking  doing house work (cleaning house, washing dishes, laundry)  assisting others    Interactions/Relationships  no deficits    Life Areas  no deficits    Community and Social Life  community life  recreation and leisure         low   Clinical Presentation stable and uncomplicated low   Decision Making/ Complexity Score: low     Goals:  Short Term Goals (4 Weeks):   1. Pt will report 20% reduction in pain of the lumbar spine and LE for ease with ADL's  2. PT will demonstrate improved trunk strength by a half grade in for ease with upright posture during standing activities.  3. Pt will demonstrate improved lumbar spine ROM in all directions by a half grade for ease with bending activities.   4. Pt to demonstrate improved functional ability with FOTO limitation <=25% disability.    Long Term Goals (8 Weeks):   1. Pt will report being independent with HEP for maintenance of improvements gained during therapy  sessions  2. PT will report 50% reduction of pain of the back and LE for ease with dressing and grooming activities.   3. Pt will demonstrate trunk and extremity strength to >=4+/5 without the provocation of pain for ease with household chores  4. Pt will demonstrate appropriate upright posture without external cueing for ease with work related activities.   5. Pt to demonstrate improved functional ability with FOTO limitation <=20% disability.      Plan   Plan of care Certification: 2/28/2019 to 05/28/2019.    Outpatient Physical Therapy 2 times weekly for 8 weeks to include the following interventions: Aquatic Therapy, Cervical/Lumbar Traction, Electrical Stimulation prn, Iontophoresis (with dexamethasone prn), Manual Therapy, Moist Heat/ Ice, Neuromuscular Re-ed, Patient Education, Self Care, Therapeutic Activites, Therapeutic Exercise and IASTYM, therapeutic taping, dry needling. Progress HEP towards D/C. Recommend F/U with MD if symptoms worsen or do not resolve. Patient may be seen by a PTA for treatment to carry out their plan of care.  Face-to-face conferences will be held.     Libby Calabrese, PT

## 2019-03-04 NOTE — PROGRESS NOTES
"  Physical Therapy Daily Treatment Note     Name: Eliz Hartman  Clinic Number: [id number]    Therapy Diagnosis:   Encounter Diagnoses   Name Primary?    Chronic bilateral low back pain with bilateral sciatica     Decreased strength, endurance, and mobility      Physician: Yolette Jack MD    Visit Date: 3/6/2019    Physician Orders: PT Eval and Treat   Medical Diagnosis from Referral: M54.42,M54.41,G89.29 (ICD-10-CM) - Chronic bilateral low back pain with bilateral sciatica  Evaluation Date: 2/28/2019  Authorization Period Expiration: 02/25/2020  Plan of Care Expiration: 05/28/2019  Visit # / Visits authorized: 1/ 1     Time In: 11:00  Time Out: 12:00  Total Billable Time: 60 minutes     Precautions: Standard, osteoporosis, pacemaker, Afib    Subjective     Pt reports: no change in symptoms since the initial visit "but I'm not having a flare up right now so of course it doesn't hurt."  She was compliant with home exercise program.  Response to previous treatment: good  Functional change: none    Pain: 3/10  Location: bilateral low back and LE (anterio-lateral thigh)     Objective     Eliz received therapeutic exercises to develop strength, endurance, ROM, flexibility, posture and core stabilization for 60 minutes including:    Pretzle stretch 2 x 1'  Figure 4 piriformis stretch 2 x 1'  SL hip abd 3 x 15  SLR  2 x 15  Prone hip ext 2 x 15  Prone quad stretch 2 x 1'    Resisted open books 20 x OTB  Narrow rows: 2 x 15 x BTB  SALPD: 2 x 15 x GTB    Next visit add: clams and bridges with band, core progressions    Home Exercises Provided and Patient Education Provided     Education provided:   - updated HEP (see EMR) to include: 3-way hip, prone quad stretch, standing narrow rows and SALPD    Written Home Exercises Provided: Patient instructed to cont prior HEP.  Exercises were reviewed and Eliz was able to demonstrate them prior to the end of the session.  Eliz demonstrated good  understanding of the education " provided.     See EMR under Patient Instructions for exercises provided prior visit.    Assessment     Pt tolerated full session well today. Good return technique of HEP, indicating good compliance at home. Pt argumentative regarding HEP and progression of therex. New exercises added today to promote trunk and multidirectional hip strength and motor control. VC and TC during standing exercises for technique and scapular activation.     Eliz is progressing well towards her goals.   Pt prognosis is Good.     Pt will continue to benefit from skilled outpatient physical therapy to address the deficits listed in the problem list box on initial evaluation, provide pt/family education and to maximize pt's level of independence in the home and community environment.     Pt's spiritual, cultural and educational needs considered and pt agreeable to plan of care and goals.     Anticipated barriers to physical therapy: limited availability to attend PT as pt lives in [place] 50%, [place] 50% and has several month long vacations scheduled through the next several months.    Goals:   Short Term Goals (4 Weeks):   1. Pt will report 20% reduction in pain of the lumbar spine and LE for ease with ADL's  2. PT will demonstrate improved trunk strength by a half grade in for ease with upright posture during standing activities.  3. Pt will demonstrate improved lumbar spine ROM in all directions by a half grade for ease with bending activities.   4. Pt to demonstrate improved functional ability with FOTO limitation <=25% disability.     Long Term Goals (8 Weeks):   1. Pt will report being independent with HEP for maintenance of improvements gained during therapy sessions  2. PT will report 50% reduction of pain of the back and LE for ease with dressing and grooming activities.   3. Pt will demonstrate trunk and extremity strength to >=4+/5 without the provocation of pain for ease with household chores  4. Pt will demonstrate appropriate upright  posture without external cueing for ease with work related activities.   5. Pt to demonstrate improved functional ability with FOTO limitation <=20% disability.    Plan     Continue with POC.     Libby Calabrese, PT

## 2019-03-06 ENCOUNTER — CLINICAL SUPPORT (OUTPATIENT)
Dept: REHABILITATION | Facility: OTHER | Age: 69
End: 2019-03-06
Payer: MEDICARE

## 2019-03-06 DIAGNOSIS — M54.41 CHRONIC BILATERAL LOW BACK PAIN WITH BILATERAL SCIATICA: ICD-10-CM

## 2019-03-06 DIAGNOSIS — R53.1 DECREASED STRENGTH, ENDURANCE, AND MOBILITY: ICD-10-CM

## 2019-03-06 DIAGNOSIS — Z74.09 DECREASED STRENGTH, ENDURANCE, AND MOBILITY: ICD-10-CM

## 2019-03-06 DIAGNOSIS — R68.89 DECREASED STRENGTH, ENDURANCE, AND MOBILITY: ICD-10-CM

## 2019-03-06 DIAGNOSIS — G89.29 CHRONIC BILATERAL LOW BACK PAIN WITH BILATERAL SCIATICA: ICD-10-CM

## 2019-03-06 DIAGNOSIS — M54.42 CHRONIC BILATERAL LOW BACK PAIN WITH BILATERAL SCIATICA: ICD-10-CM

## 2019-03-06 PROCEDURE — 97110 THERAPEUTIC EXERCISES: CPT | Mod: PN

## 2019-03-11 ENCOUNTER — TELEPHONE (OUTPATIENT)
Dept: INTERNAL MEDICINE | Facility: CLINIC | Age: 69
End: 2019-03-11

## 2019-03-11 ENCOUNTER — HOSPITAL ENCOUNTER (OUTPATIENT)
Dept: RADIOLOGY | Facility: HOSPITAL | Age: 69
Discharge: HOME | End: 2019-03-11
Attending: INTERNAL MEDICINE
Payer: MEDICARE

## 2019-03-11 ENCOUNTER — OFFICE VISIT (OUTPATIENT)
Dept: INTERNAL MEDICINE | Facility: CLINIC | Age: 69
End: 2019-03-11
Payer: MEDICARE

## 2019-03-11 VITALS
HEART RATE: 71 BPM | OXYGEN SATURATION: 98 % | HEIGHT: 66 IN | SYSTOLIC BLOOD PRESSURE: 126 MMHG | DIASTOLIC BLOOD PRESSURE: 84 MMHG | BODY MASS INDEX: 26.72 KG/M2 | WEIGHT: 166.25 LBS
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
## 2019-03-11 DIAGNOSIS — S80.12XA HEMATOMA OF LEG, LEFT, INITIAL ENCOUNTER: ICD-10-CM

## 2019-03-11 PROCEDURE — 99999 PR PBB SHADOW E&M-EST. PATIENT-LVL IV: CPT | Mod: PBBFAC,,, | Performed by: INTERNAL MEDICINE

## 2019-03-11 PROCEDURE — 73630 XR FOOT COMPLETE 3 VIEW LEFT: ICD-10-PCS | Mod: 26,LT,, | Performed by: RADIOLOGY

## 2019-03-11 PROCEDURE — 73590 X-RAY EXAM OF LOWER LEG: CPT | Mod: 26,LT,, | Performed by: RADIOLOGY

## 2019-03-11 PROCEDURE — 73610 XR ANKLE COMPLETE 3 VIEW LEFT: ICD-10-PCS | Mod: 26,LT,, | Performed by: RADIOLOGY

## 2019-03-11 PROCEDURE — 73610 X-RAY EXAM OF ANKLE: CPT | Mod: 26,LT,, | Performed by: RADIOLOGY

## 2019-03-11 PROCEDURE — 73590 X-RAY EXAM OF LOWER LEG: CPT | Mod: TC,LT

## 2019-03-11 PROCEDURE — 73630 X-RAY EXAM OF FOOT: CPT | Mod: 26,LT,, | Performed by: RADIOLOGY

## 2019-03-11 PROCEDURE — 73590 XR TIBIA FIBULA 2 VIEW LEFT: ICD-10-PCS | Mod: 26,LT,, | Performed by: RADIOLOGY

## 2019-03-11 PROCEDURE — 73630 X-RAY EXAM OF FOOT: CPT | Mod: TC,LT

## 2019-03-11 PROCEDURE — 99999 PR PBB SHADOW E&M-EST. PATIENT-LVL IV: ICD-10-PCS | Mod: PBBFAC,,, | Performed by: INTERNAL MEDICINE

## 2019-03-11 PROCEDURE — 99214 OFFICE O/P EST MOD 30 MIN: CPT | Mod: S$PBB,,, | Performed by: INTERNAL MEDICINE

## 2019-03-11 PROCEDURE — 99214 PR OFFICE/OUTPT VISIT, EST, LEVL IV, 30-39 MIN: ICD-10-PCS | Mod: S$PBB,,, | Performed by: INTERNAL MEDICINE

## 2019-03-11 PROCEDURE — 99214 OFFICE O/P EST MOD 30 MIN: CPT | Mod: PBBFAC | Performed by: INTERNAL MEDICINE

## 2019-03-11 PROCEDURE — 73610 X-RAY EXAM OF ANKLE: CPT | Mod: TC,LT

## 2019-03-11 NOTE — TELEPHONE ENCOUNTER
----- Message from Marlee Wilson sent at 3/11/2019  8:10 AM CDT -----  Contact: [phone]  Patient is requesting a call from Evelyn regarding seeing the doctor before Wednesday.  Patient needs to reschedule her Wednesday appt.    Please advise, thank you.

## 2019-03-11 NOTE — PROGRESS NOTES
INTERNAL MEDICINE ESTABLISHED PATIENT VISIT NOTE    Subjective:     Chief Complaint: Fall (left leg wound) and Foot Swelling (left)       Patient ID: Eliz Hartman is a 68 y.o. female with chronic A fib s/p DCCV in the past, hx bradycardia c syncope s/p PM, nontoxic MNG, osteoporosis, sabiha hematuria (seen by urology who did cysto, suspected due to vaginal atrophy and on estrace),  last seen by me at the end of Feb for back pain c sciatic sx, here today for focused same day appt due to recent fall.    Pt reports she was wearing heels and went out about a week ago, was going down the steps outside her house and mis-stepped on the last step and fell onto her hands and knees.  Thinks she twisted her leg just below the R knee but says sx on R leg have improved and also had an injury to her L lateral calf distally.  Is on anticoagulation for A-fib/stroke prevention and had a small hematoma on her L leg but says it has improved slightly.  Has swelling on her L foot and L ankle but says pain is minimal c good ROM.    Past Medical History:  Past Medical History:   Diagnosis Date    Atrial fibrillation     Multiple thyroid nodules     Osteoporosis     Pacemaker        Home Medications:  Prior to Admission medications    Medication Sig Start Date End Date Taking? Authorizing Provider   ammonium lactate 12 % Crea Apply 1 application topically once daily. 7/17/17  Yes Zahida Streeter DPM   CALCIUM CARBONATE/VITAMIN D3 (CALCIUM 500 WITH D ORAL) Take 2 each by mouth once daily at 6am.   Yes Historical Provider, MD   diclofenac sodium (VOLTAREN) 1 % Gel Apply 2 g topically 2 (two) times daily. 7/18/17  Yes Zahida Streeter DPM   methylPREDNISolone (MEDROL DOSEPACK) 4 mg tablet use as directed 2/25/19 3/18/19 Yes Yolette Jack MD   multivitamin capsule Take 1 capsule by mouth once daily.   Yes Historical Provider, MD   XARELTO 20 mg Tab Take 20 mg by mouth once daily. 3/18/17  Yes Historical Provider, MD   estradiol  "(ESTRACE) 0.01 % (0.1 mg/gram) vaginal cream Place 1 g vaginally 3 (three) times a week. Place by fingertip application before bedtime three times a week (Monday, Wednesday, Friday) 3/20/17 4/19/17  Rosie Monaco MD   triamcinolone acetonide 0.1% (KENALOG) 0.1 % cream Apply topically 2 (two) times daily. 5/24/18 6/3/18  Yolette Jack MD       Allergies:  Review of patient's allergies indicates:  No Known Allergies    Social History:  Social History     Tobacco Use    Smoking status: Never Smoker    Smokeless tobacco: Never Used   Substance Use Topics    Alcohol use: Yes     Comment: 2 glasses wine/day    Drug use: No        Review of Systems   Constitutional: Negative for chills, fatigue and fever.   Respiratory: Negative for cough, chest tightness and shortness of breath.    Cardiovascular: Negative for chest pain.   Gastrointestinal: Negative for abdominal pain and blood in stool.   Genitourinary: Negative for dysuria and frequency.   Musculoskeletal: Positive for arthralgias (R knee, L ankle and foot as per HPI).   Skin: Positive for wound (L LE as per HPI).         Health Maintenance:     NOT ADDRESSED TODAY  Immunizations:   Influenza is not up to date  TDap is up to date, within 10 yrs per pt  Prevnar today  Shingrix rec, out of stock today     Cancer Screening:  PAP: Dr. Coulter, sees annually, seen in [facility] within the last year, no issues.  Mammogram: 1/2017, needs updated, declined today as she states she has to be somewhere at noon, can call later to schedule.  Colonoscopy: 5 years ago normal per pt  DEXA: 11/2017 osteopenia per pt    Objective:   /84 (BP Location: Left arm, Patient Position: Sitting, BP Method: Medium (Manual))   Pulse 71   Ht 5' 6" (1.676 m)   Wt 75.4 kg (166 lb 3.6 oz)   SpO2 98%   BMI 26.83 kg/m²        General: AAO x3, no apparent distress  CV: RRR, no m/r/g  Pulm: Lungs CTAB, no crackles, no wheezes  Abd: s/NT/ND +BS  Extremities: abrasion over L knee c hematoma on " distal lateral LE, mild ttp.  No calf swelling or tenderness.  Neg Harshil's sign.  Slight bruising and swelling noted at base of toes on L foot on dorsal aspect and also medially near the ankle.  ROM of toes and ankle wnl.    Labs:         Assessment/Plan     Eliz was seen today for fall and foot swelling.    Diagnoses and all orders for this visit:    Fall (on) (from) other stairs and steps, initial encounter  Hx osteporosis, will check xrays to further evaluate.  rec rest, ice, compression, and elevation.  Pt leaves on Thursday for [place].  -     X-Ray Foot Complete 3 view Left; Future  -     X-Ray Ankle Complete 3 View Left; Future  -     X-Ray Tibia Fibula 2 View Left; Future    Hematoma of leg, left, initial encounter  Advised should improve c time.  Can wrap if needed, elevation can help.      HM as above  RTC in 3-4 mos for routine f/u.    Yolette Jack MD  Department of Internal Medicine - [facility]  03/11/2019

## 2019-03-13 ENCOUNTER — CLINICAL SUPPORT (OUTPATIENT)
Dept: REHABILITATION | Facility: OTHER | Age: 69
End: 2019-03-13
Payer: MEDICARE

## 2019-03-13 DIAGNOSIS — G89.29 CHRONIC BILATERAL LOW BACK PAIN WITH BILATERAL SCIATICA: ICD-10-CM

## 2019-03-13 DIAGNOSIS — R68.89 DECREASED STRENGTH, ENDURANCE, AND MOBILITY: ICD-10-CM

## 2019-03-13 DIAGNOSIS — Z74.09 DECREASED STRENGTH, ENDURANCE, AND MOBILITY: ICD-10-CM

## 2019-03-13 DIAGNOSIS — R53.1 DECREASED STRENGTH, ENDURANCE, AND MOBILITY: ICD-10-CM

## 2019-03-13 DIAGNOSIS — M54.42 CHRONIC BILATERAL LOW BACK PAIN WITH BILATERAL SCIATICA: ICD-10-CM

## 2019-03-13 DIAGNOSIS — M54.41 CHRONIC BILATERAL LOW BACK PAIN WITH BILATERAL SCIATICA: ICD-10-CM

## 2019-03-13 PROCEDURE — 97110 THERAPEUTIC EXERCISES: CPT | Mod: PN

## 2019-03-13 NOTE — PROGRESS NOTES
"  Physical Therapy Daily Treatment Note     Name: Eliz Hartman  Clinic Number: [id number]    Therapy Diagnosis:   Encounter Diagnoses   Name Primary?    Chronic bilateral low back pain with bilateral sciatica     Decreased strength, endurance, and mobility      Physician: Yolette Jack MD    Visit Date: 3/13/2019    Physician Orders: PT Eval and Treat   Medical Diagnosis from Referral: M54.42,M54.41,G89.29 (ICD-10-CM) - Chronic bilateral low back pain with bilateral sciatica  Evaluation Date: 2/28/2019  Authorization Period Expiration: 02/25/2020  Plan of Care Expiration: 05/28/2019  Visit # / Visits authorized: 1/ 1     Time In: 3:00  Time Out: 3:55  Total Billable Time: 25 minutes     Precautions: Standard, osteoporosis, pacemaker, Afib    Subjective     Pt reports: continued aggravation.in the back and hips. "It feels the same." she denies soreness after the previous visit and compliance with her HEP. "I am leaving to go OOT tomorrow."  She was compliant with home exercise program.  Response to previous treatment: good  Functional change: none    Pain: 2/10  Location: bilateral low back and LE (anterio-lateral thigh)     Objective     Eliz received therapeutic exercises to develop strength, endurance, ROM, flexibility, posture and core stabilization for 55 minutes including:    Pretzle stretch 2 x 1'  Figure 4 piriformis stretch 2 x 1'  SL hip abd 2 x 15 x 2#  SLR  2 x 15 x 2#  Prone hip ext 2 x 15 x 2#  Prone quad stretch 2 x 1'  +clams: 2 x 15 x OTB  +Bridge: 2 x 15 x OTB  +TA activation: 10 x 10"  +BKFO: 2 x 10  +dead bugs: 2 x 10      Resisted open books 20 x OTB  Narrow rows: 2 x 15 x BTB  SALPD: 2 x 15 x GTB      Next visit add: clams and bridges with band, core progressions    Home Exercises Provided and Patient Education Provided     Education provided:   - updated HEP (see EMR) to include: 3-way hip, prone quad stretch, standing narrow rows and SALPD    Written Home Exercises Provided: Patient instructed " to cont prior HEP.  Exercises were reviewed and Eliz was able to demonstrate them prior to the end of the session.  Eliz demonstrated good  understanding of the education provided.     See EMR under Patient Instructions for exercises provided prior visit.    Assessment     Fair TA activation with difficulty maintained during dual tasking. New HEP issued today with emphasis on core activation and endurance. TC required during planks, BKFO, and dead bugs to engage core to limit compensatory trunk and pelvic movement.    Eliz is progressing well towards her goals.   Pt prognosis is Good.     Pt will continue to benefit from skilled outpatient physical therapy to address the deficits listed in the problem list box on initial evaluation, provide pt/family education and to maximize pt's level of independence in the home and community environment.     Pt's spiritual, cultural and educational needs considered and pt agreeable to plan of care and goals.     Anticipated barriers to physical therapy: limited availability to attend PT as pt lives in [place] 50%, OOT 50% and has several month long vacations scheduled through the next several months.    Goals:   Short Term Goals (4 Weeks):   1. Pt will report 20% reduction in pain of the lumbar spine and LE for ease with ADL's  2. PT will demonstrate improved trunk strength by a half grade in for ease with upright posture during standing activities.  3. Pt will demonstrate improved lumbar spine ROM in all directions by a half grade for ease with bending activities.   4. Pt to demonstrate improved functional ability with FOTO limitation <=25% disability.     Long Term Goals (8 Weeks):   1. Pt will report being independent with HEP for maintenance of improvements gained during therapy sessions  2. PT will report 50% reduction of pain of the back and LE for ease with dressing and grooming activities.   3. Pt will demonstrate trunk and extremity strength to >=4+/5 without the  provocation of pain for ease with household chores  4. Pt will demonstrate appropriate upright posture without external cueing for ease with work related activities.   5. Pt to demonstrate improved functional ability with FOTO limitation <=20% disability.    Plan     Continue with POC.     Libby Calabrese, PT

## 2019-03-25 ENCOUNTER — CLINICAL SUPPORT (OUTPATIENT)
Dept: REHABILITATION | Facility: OTHER | Age: 69
End: 2019-03-25
Payer: MEDICARE

## 2019-03-25 DIAGNOSIS — R53.1 DECREASED STRENGTH, ENDURANCE, AND MOBILITY: ICD-10-CM

## 2019-03-25 DIAGNOSIS — M54.41 CHRONIC BILATERAL LOW BACK PAIN WITH BILATERAL SCIATICA: ICD-10-CM

## 2019-03-25 DIAGNOSIS — G89.29 CHRONIC BILATERAL LOW BACK PAIN WITH BILATERAL SCIATICA: ICD-10-CM

## 2019-03-25 DIAGNOSIS — Z74.09 DECREASED STRENGTH, ENDURANCE, AND MOBILITY: ICD-10-CM

## 2019-03-25 DIAGNOSIS — M54.42 CHRONIC BILATERAL LOW BACK PAIN WITH BILATERAL SCIATICA: ICD-10-CM

## 2019-03-25 DIAGNOSIS — R68.89 DECREASED STRENGTH, ENDURANCE, AND MOBILITY: ICD-10-CM

## 2019-03-25 PROCEDURE — 97110 THERAPEUTIC EXERCISES: CPT | Mod: PN

## 2019-03-25 NOTE — PROGRESS NOTES
"  Physical Therapy Daily Treatment Note     Name: Eliz Hartman  Clinic Number: [id number]    Therapy Diagnosis:   Encounter Diagnoses   Name Primary?    Chronic bilateral low back pain with bilateral sciatica     Decreased strength, endurance, and mobility      Physician: Yolette Jack MD    Visit Date: 3/25/2019    Physician Orders: PT Eval and Treat   Medical Diagnosis from Referral: M54.42,M54.41,G89.29 (ICD-10-CM) - Chronic bilateral low back pain with bilateral sciatica  Evaluation Date: 2/28/2019  Authorization Period Expiration: 02/25/2020  Plan of Care Expiration: 05/28/2019  Visit # / Visits authorized: 1/ 1     Time In: 12:00  Time Out: 1:00  Total Billable Time: 30 minutes     Precautions: Standard, osteoporosis, pacemaker, Afib    Subjective     Pt reports: that she has been OOT x 10 days for vacation. She denies compliance with HEP while OOT. C/c remains stiffness in the mornings in the back.   She was not compliant with home exercise program.  Response to previous treatment: good  Functional change: none    Pain: 2/10  Location: bilateral low back and LE (anterio-lateral thigh)     Objective     Eliz received therapeutic exercises to develop strength, endurance, ROM, flexibility, posture and core stabilization for 60 minutes including:    Pretzle stretch 2 x 1'  Figure 4 piriformis stretch 2 x 1'  SL hip abd 2 x 15 x 2#  SLR  2 x 15 x 2#  Prone hip ext 2 x 15 x 2#  Prone quad stretch 2 x 1'  clams: 2 x 15 x OTB  Bridge: 2 x 15 x OTB  TA activation: 10 x 10"  BKFO: 2 x 10 x OTB  dead bugs: 2 x 10 -- change to 90-90 next visit w/ PB      Resisted open books 20 x OTB  Narrow rows: 2 x 15 x BTB  SALPD: 2 x 15 x GTB  +antirotations: 2 x 15 x OTB    Next visit add:  core progressions    Home Exercises Provided and Patient Education Provided     Education provided:   - updated HEP (see EMR) to include: 3-way hip, prone quad stretch, standing narrow rows and SALPD  +added to HEP (see EMR): anti " rotations    Written Home Exercises Provided: Patient instructed to cont prior HEP.  Exercises were reviewed and Eliz was able to demonstrate them prior to the end of the session.  Eliz demonstrated good  understanding of the education provided.     See EMR under Patient Instructions for exercises provided prior visit.    Assessment     Poor compliance with HEP limits progression of therex program. Fair TA activation with dual tasking; pt continues to require VC for limitation of compensatory movement.   Pt to be OOT until 05/01/2019. Heavy edu on consistency and compliance with HEP to progress strength and stability. Consider D/C in May pending pt progress and symptomology.    Eliz is progressing well towards her goals.   Pt prognosis is Good.     Pt will continue to benefit from skilled outpatient physical therapy to address the deficits listed in the problem list box on initial evaluation, provide pt/family education and to maximize pt's level of independence in the home and community environment.     Pt's spiritual, cultural and educational needs considered and pt agreeable to plan of care and goals.     Anticipated barriers to physical therapy: limited availability to attend PT as pt lives in [place] 50%, OOT 50% and has several month long vacations scheduled through the next several months.    Goals:   Short Term Goals (4 Weeks):   1. Pt will report 20% reduction in pain of the lumbar spine and LE for ease with ADL's  2. PT will demonstrate improved trunk strength by a half grade in for ease with upright posture during standing activities.  3. Pt will demonstrate improved lumbar spine ROM in all directions by a half grade for ease with bending activities.   4. Pt to demonstrate improved functional ability with FOTO limitation <=25% disability.     Long Term Goals (8 Weeks):   1. Pt will report being independent with HEP for maintenance of improvements gained during therapy sessions  2. PT will report 50%  reduction of pain of the back and LE for ease with dressing and grooming activities.   3. Pt will demonstrate trunk and extremity strength to >=4+/5 without the provocation of pain for ease with household chores  4. Pt will demonstrate appropriate upright posture without external cueing for ease with work related activities.   5. Pt to demonstrate improved functional ability with FOTO limitation <=20% disability.    Plan     Continue with POC.     Libby Calabrese, PT

## 2019-05-09 ENCOUNTER — DOCUMENTATION ONLY (OUTPATIENT)
Dept: REHABILITATION | Facility: OTHER | Age: 69
End: 2019-05-09

## 2019-05-09 DIAGNOSIS — R53.1 DECREASED STRENGTH, ENDURANCE, AND MOBILITY: ICD-10-CM

## 2019-05-09 DIAGNOSIS — M54.42 CHRONIC BILATERAL LOW BACK PAIN WITH BILATERAL SCIATICA: ICD-10-CM

## 2019-05-09 DIAGNOSIS — R68.89 DECREASED STRENGTH, ENDURANCE, AND MOBILITY: ICD-10-CM

## 2019-05-09 DIAGNOSIS — M54.41 CHRONIC BILATERAL LOW BACK PAIN WITH BILATERAL SCIATICA: ICD-10-CM

## 2019-05-09 DIAGNOSIS — Z74.09 DECREASED STRENGTH, ENDURANCE, AND MOBILITY: ICD-10-CM

## 2019-05-09 DIAGNOSIS — G89.29 CHRONIC BILATERAL LOW BACK PAIN WITH BILATERAL SCIATICA: ICD-10-CM

## 2019-05-09 NOTE — PROGRESS NOTES
REHAB SERVICES OUTPATIENT DISCHARGE SUMMARY  Physical Therapy      Name:  Eliz Hartman  Date:  5/9/2019    Date of Evaluation:  02/28/2019  Physician: Yolette Jack MD  Total # Of Visits:  4   Diagnosis:    Encounter Diagnoses   Name Primary?    Chronic bilateral low back pain with bilateral sciatica     Decreased strength, endurance, and mobility          Physical/Functional Status:  Unable to assess pt's functional limitations and current impairments due to pt not returning to the clinic.     The patient is to be discharged from our Therapy service for the following reason(s):  Patient has not attended therapy since 03/25/2019    Degree of Goal Achievement: Patient has not met goals secondary to:  Not returning to clinic for follow up assessment.    Patient Education: None provided    Discharge Plan:  D/C due to non-compliance for remaining visit.    Libby Calabrese, PT  5/9/2019

## 2019-09-20 ENCOUNTER — TELEPHONE (OUTPATIENT)
Dept: INTERNAL MEDICINE | Facility: CLINIC | Age: 69
End: 2019-09-20

## 2019-09-20 ENCOUNTER — PATIENT MESSAGE (OUTPATIENT)
Dept: INTERNAL MEDICINE | Facility: CLINIC | Age: 69
End: 2019-09-20

## 2019-09-20 DIAGNOSIS — L98.9 SKIN LESION: Primary | ICD-10-CM

## 2019-09-20 DIAGNOSIS — Z12.31 SCREENING MAMMOGRAM, ENCOUNTER FOR: Primary | ICD-10-CM

## 2019-09-20 NOTE — TELEPHONE ENCOUNTER
----- Message from Latasha Crow sent at 9/20/2019  1:23 PM CDT -----  Pt would like to be called back regarding a mammo     Pt can be reached at [phone]

## 2019-09-23 ENCOUNTER — TELEPHONE (OUTPATIENT)
Dept: INTERNAL MEDICINE | Facility: CLINIC | Age: 69
End: 2019-09-23

## 2019-09-23 NOTE — TELEPHONE ENCOUNTER
----- Message from Yolette Jack MD sent at 9/23/2019  8:13 AM CDT -----  Please call pt to clarify what she needs.  Does she need a mammogram ordered?  If so it has been done and a message was sent to checkout to call her to schedule.  Just make sure that is why she was calling since the message from carly is unclear.  ----- Message -----  From: Carly White MA  Sent: 9/20/2019   5:31 PM CDT  To: Yolette Jack MD        ----- Message -----  From: Latasha Crow  Sent: 9/20/2019   1:23 PM  To: [facility] Staff    Pt would like to be called back regarding a mammo     Pt can be reached at [phone]

## 2019-09-27 ENCOUNTER — HOSPITAL ENCOUNTER (OUTPATIENT)
Dept: RADIOLOGY | Facility: OTHER | Age: 69
Discharge: HOME | End: 2019-09-27
Attending: INTERNAL MEDICINE
Payer: MEDICARE

## 2019-09-27 DIAGNOSIS — Z12.31 SCREENING MAMMOGRAM, ENCOUNTER FOR: ICD-10-CM

## 2019-09-27 PROCEDURE — 77063 MAMMO DIGITAL SCREENING BILAT WITH TOMOSYNTHESIS_CAD: ICD-10-PCS | Mod: 26,,, | Performed by: RADIOLOGY

## 2019-09-27 PROCEDURE — 77067 SCR MAMMO BI INCL CAD: CPT | Mod: 26,,, | Performed by: RADIOLOGY

## 2019-09-27 PROCEDURE — 77063 BREAST TOMOSYNTHESIS BI: CPT | Mod: 26,,, | Performed by: RADIOLOGY

## 2019-09-27 PROCEDURE — 77067 SCR MAMMO BI INCL CAD: CPT | Mod: TC

## 2019-09-27 PROCEDURE — 77067 MAMMO DIGITAL SCREENING BILAT WITH TOMOSYNTHESIS_CAD: ICD-10-PCS | Mod: 26,,, | Performed by: RADIOLOGY

## 2019-10-03 ENCOUNTER — PATIENT OUTREACH (OUTPATIENT)
Dept: ADMINISTRATIVE | Facility: OTHER | Age: 69
End: 2019-10-03

## 2019-10-07 ENCOUNTER — OFFICE VISIT (OUTPATIENT)
Dept: PODIATRY | Facility: CLINIC | Age: 69
End: 2019-10-07
Payer: MEDICARE

## 2019-10-07 VITALS — HEIGHT: 66 IN | WEIGHT: 166 LBS | BODY MASS INDEX: 26.68 KG/M2

## 2019-10-07 DIAGNOSIS — L84 CORN OF TOE: Primary | ICD-10-CM

## 2019-10-07 PROCEDURE — 17999 UNLISTD PX SKN MUC MEMB SUBQ: CPT | Mod: CSM,,, | Performed by: PODIATRIST

## 2019-10-07 PROCEDURE — 99999 PR PBB SHADOW E&M-EST. PATIENT-LVL III: ICD-10-PCS | Mod: PBBFAC,,, | Performed by: PODIATRIST

## 2019-10-07 PROCEDURE — 99999 PR PBB SHADOW E&M-EST. PATIENT-LVL III: CPT | Mod: PBBFAC,,, | Performed by: PODIATRIST

## 2019-10-07 PROCEDURE — 99499 UNLISTED E&M SERVICE: CPT | Mod: ,,, | Performed by: PODIATRIST

## 2019-10-07 PROCEDURE — 99499 NO LOS: ICD-10-PCS | Mod: ,,, | Performed by: PODIATRIST

## 2019-10-07 PROCEDURE — 99213 OFFICE O/P EST LOW 20 MIN: CPT | Mod: PBBFAC,PN | Performed by: PODIATRIST

## 2019-10-07 PROCEDURE — 17999 PR NON-COVERED FOOT CARE: ICD-10-PCS | Mod: CSM,,, | Performed by: PODIATRIST

## 2019-10-07 NOTE — PROGRESS NOTES
"Vitals:    10/07/19 1444   Weight: 75.3 kg (166 lb)   Height: 5' 6" (1.676 m)       Corn of toe - Right Foot        Patient presents to the clinic for non-covered routine foot care. Patient is not a high risk foot care patient. Patient understands this is not typically a covered service and patient is aware of responsibility of payment. Pedal pulses are palpable. No known risk factors requiring routine foot care.  Corns/calluses  were reduced and trimmed bilaterally. Patient tolerated well.     Return to office as needed.    "

## 2020-02-24 ENCOUNTER — TELEPHONE (OUTPATIENT)
Dept: OTOLARYNGOLOGY | Facility: CLINIC | Age: 70
End: 2020-02-24

## 2020-02-24 NOTE — TELEPHONE ENCOUNTER
----- Message from Christina Stallings sent at 2/24/2020 12:04 PM CST -----  Contact: My Chart Request  Patient requesting to speak with you regarding  Appointment Request From: Eliz Hartman    With Provider: Yolette Jack MD [[facility] - Internal Medicine]    Preferred Date Range: 2/26/2020 - 2/28/2020    Preferred Times: Any time    Reason for visit: Existing Patient    Comments:  Persistent chest congestion, severe coughing, persistent pain in throat following intubation during a surgical procedure on Jan 24th.    Symptoms persist. I would like to see an Ear, Nose, Throat specialist on Wed, Thurs, Friday or Sat Feb 26, 27, 28 or 29.  ----- Message -----    Please call and advise/ [phone]

## 2020-03-24 ENCOUNTER — TELEPHONE (OUTPATIENT)
Dept: INTERNAL MEDICINE | Facility: CLINIC | Age: 70
End: 2020-03-24

## 2020-03-24 NOTE — TELEPHONE ENCOUNTER
----- Message from Selina Beard sent at 3/23/2020  4:33 PM CDT -----  Patient not happy about change in appt , pt decline seeing another Dr. Pt wants to speak with the nurse

## 2020-03-24 NOTE — PROGRESS NOTES
INTERNAL MEDICINE PROGRESS NOTE    CHIEF COMPLAINT     Chief Complaint   Patient presents with    Follow-up       HPI     Eliz Hartman is a 69 y.o. female with chronic A fib s/p DCCV in the past, hx bradycardia c syncope s/p PM, nontoxic MNG, osteoporosis, sabiha hematuria (seen by urology who did cysto, suspected due to vaginal atrophy and on estrace) who presents for a follow up visit today.  She is an established pt of Dr Jack     The patient location is: home  The chief complaint leading to consultation is: follow up needs medication refill   Visit type: Virtual visit with synchronous audio and video  Total time spent with patient: 30  Each patient to whom he or she provides medical services by telemedicine is:  (1) informed of the relationship between the physician and patient and the respective role of any other health care provider with respect to management of the patient; and (2) notified that he or she may decline to receive medical services by telemedicine and may withdraw from such care at any time.        Osteoporosis- dexa in [facility] c osteopenia and was getting prolia injection.   Had osteopenia on dexa here in Oct and gyn rec to repeat DEXA in 2 years.    Afib- s/p DCCV in past. On xarelto and flecainide   Resumed flecainide 150 mg daily   Living bw here and [place] - will not be able to return to [place] in April as expected, will run out of medications    Cardiologist in [facility]    Ablation in [place] in January and is paced.   Has appointment with Cardiology in June.  Echo 1/20 EF 55% in [facility]     Left pleural effusion - seen by pulm in [facility]. Had thoracentesis - 3/13/20. Path negative. Thought to be idiopathic   Still having sob and cough     GERD-taking omeprazole. For LPR by ENT       HM-   MMG- 9/27/19  dexa- 10/2018 - repeat 10/2020  c-scope- 1/2013        Past Medical History:  Past Medical History:   Diagnosis Date    Atrial fibrillation     Multiple thyroid nodules     Osteoporosis      Pacemaker        Home Medications:  Prior to Admission medications    Medication Sig Start Date End Date Taking? Authorizing Provider   ammonium lactate 12 % Crea Apply 1 application topically once daily. 7/17/17   Zahida Streeter DPM   CALCIUM CARBONATE/VITAMIN D3 (CALCIUM 500 WITH D ORAL) Take 2 each by mouth once daily at 6am.    Historical Provider, MD   diclofenac sodium (VOLTAREN) 1 % Gel Apply 2 g topically 2 (two) times daily. 7/18/17   Zahida Streeter DPM   estradiol (ESTRACE) 0.01 % (0.1 mg/gram) vaginal cream Place 1 g vaginally 3 (three) times a week. Place by fingertip application before bedtime three times a week (Monday, Wednesday, Friday) 3/20/17 4/19/17  Rosie Monaco MD   multivitamin capsule Take 1 capsule by mouth once daily.    Historical Provider, MD   triamcinolone acetonide 0.1% (KENALOG) 0.1 % cream Apply topically 2 (two) times daily. 5/24/18 6/3/18  Yolette Jack MD   XARELTO 20 mg Tab Take 20 mg by mouth once daily. 3/18/17   Historical Provider, MD       Review of Systems:  Review of Systems   Constitutional: Negative for chills and fatigue.   Eyes: Negative for visual disturbance.   Respiratory: Positive for cough and shortness of breath. Negative for wheezing.    Cardiovascular: Negative for chest pain, palpitations and leg swelling.   Gastrointestinal: Negative for abdominal pain, constipation, diarrhea and nausea.   Neurological: Negative for dizziness, light-headedness and headaches.       Health Maintainence:   Immunizations:  Health Maintenance       Date Due Completion Date    Hepatitis C Screening 1950 ---    TETANUS VACCINE 09/10/1966 ---    Shingles Vaccine (2 of 3) 12/19/2016 10/24/2016    Pneumococcal Vaccine (65+ Low/Medium Risk) (2 of 2 - PPSV23) 05/24/2019 5/24/2018    Influenza Vaccine (1) 09/01/2019 ---    Mammogram 09/27/2021 9/27/2019    Override on 1/12/2017: Done ([facility])    DEXA SCAN 10/03/2021 10/3/2018    Override on 11/1/2017: Done (done  in [place], reports osteopenia)    Colonoscopy 01/01/2023 1/1/2013 (Done)    Override on 1/1/2013: Done (done 5 yrs ago and no polyps per pt, can repeat in 2023)    Lipid Panel 06/15/2023 6/15/2018           PHYSICAL EXAM     There were no vitals taken for this visit.    Physical Exam   Constitutional: She is oriented to person, place, and time. She appears well-developed and well-nourished.   HENT:   Head: Normocephalic and atraumatic.   Eyes: Pupils are equal, round, and reactive to light.   Cardiovascular: Normal rate and regular rhythm.   Pulmonary/Chest: Effort normal.   Neurological: She is alert and oriented to person, place, and time.   Psychiatric: She has a normal mood and affect.       LABS     No results found for: LABA1C, HGBA1C  CMP  Sodium   Date Value Ref Range Status   06/15/2018 142 136 - 145 mmol/L Final     Potassium   Date Value Ref Range Status   06/15/2018 4.8 3.5 - 5.1 mmol/L Final     Chloride   Date Value Ref Range Status   06/15/2018 106 95 - 110 mmol/L Final     CO2   Date Value Ref Range Status   06/15/2018 28 23 - 29 mmol/L Final     Glucose   Date Value Ref Range Status   06/15/2018 94 70 - 110 mg/dL Final     BUN, Bld   Date Value Ref Range Status   06/15/2018 18 8 - 23 mg/dL Final     Creatinine   Date Value Ref Range Status   06/15/2018 0.8 0.5 - 1.4 mg/dL Final     Calcium   Date Value Ref Range Status   06/15/2018 9.3 8.7 - 10.5 mg/dL Final     Total Protein   Date Value Ref Range Status   06/15/2018 6.4 6.0 - 8.4 g/dL Final     Albumin   Date Value Ref Range Status   06/15/2018 3.6 3.5 - 5.2 g/dL Final     Total Bilirubin   Date Value Ref Range Status   06/15/2018 0.5 0.1 - 1.0 mg/dL Final     Comment:     For infants and newborns, interpretation of results should be based  on gestational age, weight and in agreement with clinical  observations.  Premature Infant recommended reference ranges:  Up to 24 hours.............<8.0 mg/dL  Up to 48 hours............<12.0 mg/dL  3-5  days..................<15.0 mg/dL  6-29 days.................<15.0 mg/dL       Alkaline Phosphatase   Date Value Ref Range Status   06/15/2018 47 (L) 55 - 135 U/L Final     AST   Date Value Ref Range Status   06/15/2018 24 10 - 40 U/L Final     ALT   Date Value Ref Range Status   06/15/2018 15 10 - 44 U/L Final     Anion Gap   Date Value Ref Range Status   06/15/2018 8 8 - 16 mmol/L Final     eGFR if    Date Value Ref Range Status   06/15/2018 >60 >60 mL/min/1.73 m^2 Final     eGFR if non    Date Value Ref Range Status   06/15/2018 >60 >60 mL/min/1.73 m^2 Final     Comment:     Calculation used to obtain the estimated glomerular filtration  rate (eGFR) is the CKD-EPI equation.        Lab Results   Component Value Date    WBC 4.44 06/15/2018    HGB 13.8 06/15/2018    HCT 41.7 06/15/2018     (H) 06/15/2018     06/15/2018     Lab Results   Component Value Date    CHOL 196 06/15/2018     Lab Results   Component Value Date    HDL 78 (H) 06/15/2018     Lab Results   Component Value Date    LDLCALC 104.8 06/15/2018     Lab Results   Component Value Date    TRIG 66 06/15/2018     Lab Results   Component Value Date    CHOLHDL 39.8 06/15/2018     Lab Results   Component Value Date    TSH 0.996 06/15/2018       ASSESSMENT/PLAN     Eliz Hartman is a 69 y.o. female     Chronic atrial fibrillation- rate controlled. Will cont current meds. F/u with cardiology as scheduled.   -     Discontinue: flecainide (TAMBOCOR) 150 MG Tab; Take 1 tablet (150 mg total) by mouth every 12 (twelve) hours.  Dispense: 60 tablet; Refill: 11  -     flecainide (TAMBOCOR) 150 MG Tab; Take 1 tablet (150 mg total) by mouth Daily.  Dispense: 30 tablet; Refill: 11  -     XARELTO 20 mg Tab; Take 1 tablet (20 mg total) by mouth once daily.  Dispense: 30 tablet; Refill: 11    Age related osteoporosis, unspecified pathological fracture presence- stable. Will cont calcium and vitamin d     Pleural effusion- stable.  Will f/u with pulmonology     Gastroesophageal reflux disease, esophagitis presence not specified- stable. Will cont current meds.     Follow up with PCP    Patient education provided from [facility]. Patient was counseled on when and how to seek emergent care.       Aletha RAJPUT, KAUR, FNP-c   Department of Internal Medicine - [facility]  2:57 PM

## 2020-03-24 NOTE — TELEPHONE ENCOUNTER
LVM for pt to call back the office, will offer video visit. Pt portal messgae sent.     Video visit appt made

## 2020-03-25 ENCOUNTER — OFFICE VISIT (OUTPATIENT)
Dept: INTERNAL MEDICINE | Facility: CLINIC | Age: 70
End: 2020-03-25
Payer: MEDICARE

## 2020-03-25 ENCOUNTER — TELEPHONE (OUTPATIENT)
Dept: INTERNAL MEDICINE | Facility: CLINIC | Age: 70
End: 2020-03-25

## 2020-03-25 DIAGNOSIS — I48.20 CHRONIC ATRIAL FIBRILLATION: Primary | ICD-10-CM

## 2020-03-25 DIAGNOSIS — M81.0 AGE RELATED OSTEOPOROSIS, UNSPECIFIED PATHOLOGICAL FRACTURE PRESENCE: ICD-10-CM

## 2020-03-25 DIAGNOSIS — K21.9 GASTROESOPHAGEAL REFLUX DISEASE, ESOPHAGITIS PRESENCE NOT SPECIFIED: ICD-10-CM

## 2020-03-25 DIAGNOSIS — J90 PLEURAL EFFUSION: ICD-10-CM

## 2020-03-25 PROCEDURE — 99213 OFFICE O/P EST LOW 20 MIN: CPT | Mod: 95,,, | Performed by: NURSE PRACTITIONER

## 2020-03-25 PROCEDURE — 99213 PR OFFICE/OUTPT VISIT, EST, LEVL III, 20-29 MIN: ICD-10-PCS | Mod: 95,,, | Performed by: NURSE PRACTITIONER

## 2020-03-25 RX ORDER — RIVAROXABAN 20 MG/1
20 TABLET, FILM COATED ORAL DAILY
Qty: 30 TABLET | Refills: 11 | Status: SHIPPED | OUTPATIENT
Start: 2020-03-25

## 2020-03-25 RX ORDER — FLECAINIDE ACETATE 150 MG/1
150 TABLET ORAL DAILY
Qty: 30 TABLET | Refills: 11 | Status: SHIPPED | OUTPATIENT
Start: 2020-03-25 | End: 2021-03-25

## 2020-03-25 RX ORDER — FLECAINIDE ACETATE 150 MG/1
150 TABLET ORAL EVERY 12 HOURS
Qty: 60 TABLET | Refills: 11 | Status: SHIPPED | OUTPATIENT
Start: 2020-03-25 | End: 2020-03-25

## 2020-03-26 ENCOUNTER — PATIENT MESSAGE (OUTPATIENT)
Dept: INTERNAL MEDICINE | Facility: CLINIC | Age: 70
End: 2020-03-26

## 2020-04-23 ENCOUNTER — PATIENT MESSAGE (OUTPATIENT)
Dept: INTERNAL MEDICINE | Facility: CLINIC | Age: 70
End: 2020-04-23

## 2020-05-19 ENCOUNTER — TELEPHONE (OUTPATIENT)
Dept: PODIATRY | Facility: CLINIC | Age: 70
End: 2020-05-19

## 2020-05-19 NOTE — TELEPHONE ENCOUNTER
----- Message from Michaela Macario sent at 5/19/2020  8:23 AM CDT -----  Contact: pt   Please call pt at [phone]    Patient is requesting a sooner appt    Thank you

## 2020-05-19 NOTE — TELEPHONE ENCOUNTER
Spoke to pt and rescheduled her appointment with Dr. Monae on Thursday. Pt voiced understanding and call ended.

## 2020-05-20 ENCOUNTER — PATIENT OUTREACH (OUTPATIENT)
Dept: ADMINISTRATIVE | Facility: OTHER | Age: 70
End: 2020-05-20

## 2020-05-20 NOTE — PROGRESS NOTES
Chart reviewed.   Immunizations: Triggered Imm Registry     Orders placed: n/a  Upcoming appts to satisfy ANAI topics: n/a

## 2020-05-21 ENCOUNTER — TELEPHONE (OUTPATIENT)
Dept: PODIATRY | Facility: CLINIC | Age: 70
End: 2020-05-21

## 2020-05-21 ENCOUNTER — OFFICE VISIT (OUTPATIENT)
Dept: PODIATRY | Facility: CLINIC | Age: 70
End: 2020-05-21
Payer: MEDICARE

## 2020-05-21 VITALS
DIASTOLIC BLOOD PRESSURE: 71 MMHG | HEART RATE: 75 BPM | WEIGHT: 166 LBS | HEIGHT: 66 IN | BODY MASS INDEX: 26.68 KG/M2 | SYSTOLIC BLOOD PRESSURE: 136 MMHG

## 2020-05-21 DIAGNOSIS — L84 CORN OF TOE: Primary | ICD-10-CM

## 2020-05-21 DIAGNOSIS — Z79.01 LONG TERM (CURRENT) USE OF ANTICOAGULANTS: ICD-10-CM

## 2020-05-21 PROCEDURE — 11056 PARNG/CUTG B9 HYPRKR LES 2-4: CPT | Mod: Q9,S$PBB,, | Performed by: PODIATRIST

## 2020-05-21 PROCEDURE — 99213 OFFICE O/P EST LOW 20 MIN: CPT | Mod: PBBFAC,PN | Performed by: PODIATRIST

## 2020-05-21 PROCEDURE — 99999 PR PBB SHADOW E&M-EST. PATIENT-LVL III: CPT | Mod: PBBFAC,,, | Performed by: PODIATRIST

## 2020-05-21 PROCEDURE — 11056 PARNG/CUTG B9 HYPRKR LES 2-4: CPT | Mod: PBBFAC,PN | Performed by: PODIATRIST

## 2020-05-21 PROCEDURE — 11056 PR TRIM BENIGN HYPERKERATOTIC SKIN LESION,2-4: ICD-10-PCS | Mod: Q9,S$PBB,, | Performed by: PODIATRIST

## 2020-05-21 PROCEDURE — 99999 PR PBB SHADOW E&M-EST. PATIENT-LVL III: ICD-10-PCS | Mod: PBBFAC,,, | Performed by: PODIATRIST

## 2020-05-21 PROCEDURE — 99499 NO LOS: ICD-10-PCS | Mod: S$PBB,,, | Performed by: PODIATRIST

## 2020-05-21 PROCEDURE — 99499 UNLISTED E&M SERVICE: CPT | Mod: S$PBB,,, | Performed by: PODIATRIST

## 2020-05-21 RX ORDER — AMMONIUM LACTATE 12 G/100G
CREAM TOPICAL
Qty: 140 G | Refills: 11 | Status: SHIPPED | OUTPATIENT
Start: 2020-05-21

## 2020-05-21 NOTE — PROGRESS NOTES
Subjective:      Patient ID: Eliz Hartman is a 69 y.o. female.    Chief Complaint: Callouses and Nail Care    Burning throbbing pain and callus right 5th toe.  Gradual onset, worsening over past several weeks, aggravated by increased weight bearing, shoe gear, pressure.  No previous medical treatment.  OTC pain med not helping. Denies trauma, surgery.     Relates regular care in [place] from her podiatrist who spends 45 min or more very slowly debriding her calluses.    Review of Systems   Constitution: Negative for chills, diaphoresis, fever, malaise/fatigue and night sweats.   Cardiovascular: Negative for claudication, cyanosis, leg swelling and syncope.   Skin: Positive for suspicious lesions. Negative for color change, dry skin, nail changes, rash and unusual hair distribution.   Musculoskeletal: Negative for falls, joint pain, joint swelling, muscle cramps, muscle weakness and stiffness.   Gastrointestinal: Negative for constipation, diarrhea, nausea and vomiting.   Neurological: Negative for brief paralysis, disturbances in coordination, focal weakness, numbness, paresthesias, sensory change and tremors.           Objective:      Physical Exam   Constitutional: She is oriented to person, place, and time. She appears well-developed and well-nourished. She is cooperative. No distress.   Cardiovascular:   Pulses:       Popliteal pulses are 2+ on the right side, and 2+ on the left side.        Dorsalis pedis pulses are 2+ on the right side, and 2+ on the left side.        Posterior tibial pulses are 2+ on the right side, and 2+ on the left side.   Capillary refill 3 seconds all toes/distal feet, all toes/both feet warm to touch.      Negative lymphadenopathy bilateral popliteal fossa and tarsal tunnel.      Negavie lower extremity edema bilateral.     Musculoskeletal:        Right ankle: She exhibits normal range of motion, no swelling, no ecchymosis, no deformity, no laceration and normal pulse. Achilles tendon  normal. Achilles tendon exhibits no pain, no defect and normal Fields's test results.   Normal angle, base, station of gait. All ten toes without clubbing, cyanosis, or signs of ischemia.  No pain to palpation bilateral lower extremities.  Range of motion, stability, muscle strength, and muscle tone normal bilateral feet and legs.    Lymphadenopathy: No inguinal adenopathy noted on the right or left side.   Negative lymphadenopathy bilateral popliteal fossa and tarsal tunnel.    Negative lymphangitic streaking bilateral feet/ankles/legs.   Neurological: She is alert and oriented to person, place, and time. She has normal strength. She displays no atrophy and no tremor. No sensory deficit. She exhibits normal muscle tone. Gait normal.   Reflex Scores:       Patellar reflexes are 2+ on the right side and 2+ on the left side.       Achilles reflexes are 2+ on the right side and 2+ on the left side.  Negative tinel sign to percussion sural, superficial peroneal, deep peroneal, saphenous, and posterior tibial nerves right and left ankles and feet.     Skin: Skin is warm, dry and intact. Capillary refill takes 2 to 3 seconds. No abrasion, no bruising, no burn, no ecchymosis, no laceration, no lesion and no rash noted. She is not diaphoretic. No cyanosis or erythema. No pallor. Nails show no clubbing.     Focal hyperkeratotic lesion consisting entirely of hyperkeratotic tissue without open skin, drainage, pus, fluctuance, malodor, or signs of infection medial plantar hallux ipj right and left and dorsal right 5th pipj.    Otherwise, Skin is normal age and health appropriate color, turgor, texture, and temperature bilateral lower extremities without ulceration, hyperpigmentation, discoloration, masses nodules or cords palpated.  No ecchymosis, erythema, edema, or cardinal signs of infection bilateral lower extremities.    All nails normal color and trophic qualities.      Psychiatric: She has a normal mood and affect.              Assessment:       Encounter Diagnoses   Name Primary?    Corn of toe - Right Foot Yes    Long term (current) use of anticoagulants          Plan:       Eliz was seen today for callouses and nail care.    Diagnoses and all orders for this visit:    Corn of toe - Right Foot    Long term (current) use of anticoagulants    Other orders  -     ammonium lactate 12 % Crea; Apply twice daily to affected parts both feet as needed.      I counseled the patient on her conditions, their implications and medical management.    With the patient's permission, I debrided hyperkeratotic lesion(s) as above totaling    3            to, not  Including dermis with sterile #15 blade.  Patient tolerated the procedure well and related significant relief.    Rx lac hydrin    otc urea prn    Unfortunately patient seems quite dissatisfied with our service today as I was unable to spend 45 min or have the appropriate instrumentation she is accustomed to with her [place] care however I believe are job should certainly make her symptom-free .  She states american podiatrists are arrogant and that I should go to [place] and learn how they do it better.Still, she is polite though dissatisfied.          Follow up if symptoms worsen or fail to improve.

## 2020-06-11 ENCOUNTER — OFFICE VISIT (OUTPATIENT)
Dept: INTERNAL MEDICINE | Facility: CLINIC | Age: 70
End: 2020-06-11
Payer: MEDICARE

## 2020-06-11 VITALS
OXYGEN SATURATION: 99 % | BODY MASS INDEX: 22.25 KG/M2 | WEIGHT: 138.44 LBS | SYSTOLIC BLOOD PRESSURE: 130 MMHG | HEART RATE: 81 BPM | HEIGHT: 66 IN | DIASTOLIC BLOOD PRESSURE: 84 MMHG

## 2020-06-11 DIAGNOSIS — I48.20 CHRONIC ATRIAL FIBRILLATION: ICD-10-CM

## 2020-06-11 DIAGNOSIS — R79.9 ABNORMAL FINDING OF BLOOD CHEMISTRY, UNSPECIFIED: ICD-10-CM

## 2020-06-11 DIAGNOSIS — Z13.1 SCREENING FOR DIABETES MELLITUS: ICD-10-CM

## 2020-06-11 DIAGNOSIS — E04.2 GOITER, NONTOXIC, MULTINODULAR: Primary | ICD-10-CM

## 2020-06-11 DIAGNOSIS — Z13.0 SCREENING, ANEMIA, DEFICIENCY, IRON: ICD-10-CM

## 2020-06-11 DIAGNOSIS — J90 PLEURAL EFFUSION: ICD-10-CM

## 2020-06-11 DIAGNOSIS — Z20.822 EXPOSURE TO COVID-19 VIRUS: ICD-10-CM

## 2020-06-11 DIAGNOSIS — M81.0 AGE RELATED OSTEOPOROSIS, UNSPECIFIED PATHOLOGICAL FRACTURE PRESENCE: ICD-10-CM

## 2020-06-11 DIAGNOSIS — Z13.220 LIPID SCREENING: ICD-10-CM

## 2020-06-11 PROCEDURE — 99999 PR PBB SHADOW E&M-EST. PATIENT-LVL V: CPT | Mod: PBBFAC,,, | Performed by: INTERNAL MEDICINE

## 2020-06-11 PROCEDURE — 99215 OFFICE O/P EST HI 40 MIN: CPT | Mod: PBBFAC | Performed by: INTERNAL MEDICINE

## 2020-06-11 PROCEDURE — 99999 PR PBB SHADOW E&M-EST. PATIENT-LVL V: ICD-10-PCS | Mod: PBBFAC,,, | Performed by: INTERNAL MEDICINE

## 2020-06-11 PROCEDURE — 99214 PR OFFICE/OUTPT VISIT, EST, LEVL IV, 30-39 MIN: ICD-10-PCS | Mod: S$PBB,,, | Performed by: INTERNAL MEDICINE

## 2020-06-11 PROCEDURE — 99214 OFFICE O/P EST MOD 30 MIN: CPT | Mod: S$PBB,,, | Performed by: INTERNAL MEDICINE

## 2020-06-11 NOTE — PROGRESS NOTES
INTERNAL MEDICINE ESTABLISHED PATIENT VISIT NOTE    Subjective:     Chief Complaint: Follow-up  A fib, thyroid nodules, osteoporosis     Patient ID: Eliz Hartman is a 69 y.o. female with chronic A fib s/p DCCV in the past and s/p ablation in [place] 1/2020, hx bradycardia c syncope s/p PM, nontoxic MNG, osteoporosis, sabiha hematuria (seen by urology who did cysto, suspected due to vaginal atrophy and on estrace), last seen by me 3/2019, here today for routine f/u and annual exam.    Was seen by Aletha for routine Telemed f/u in March.  Was supposed to go back to [facility] in April but plans changed due to COVID.    Had apparent cough around Feb/March.  Had trouble getting an appt here so was evaluated at [facility] and had CXR and subsequent CT and found to have a L pleural effusion.  States there were no lung nodules or pneumonia.  Was seen by Pulm and s/p thoracentesis in March c neg path and thought to be idiopathic.  States sx have since resolved and denies cp or sob.  No cough.  No fever.  States she had subsequent f/u c Pulm c no plan for any further intervention.     Still on Flecainide and Xarelto.  States she was seen by Cards (Dr. Tu Mcgovern) this week who interrogated her PM and was apparently back in A fib shortly after the ablation.  Has echo scheduled for next week.  Also states her old echo showed mild small effusions (pericardial?) but that cardiology was not concerned about it.    Today c c/o mild neck stiffness at times and states the muscles behind her neck feel tight at times but feels like it has eased up a bit.    Past Medical History:  Past Medical History:   Diagnosis Date    Atrial fibrillation     Multiple thyroid nodules     Osteoporosis     Pacemaker        Home Medications:  Prior to Admission medications    Medication Sig Start Date End Date Taking? Authorizing Provider   ammonium lactate 12 % Crea Apply twice daily to affected parts both feet as needed. 5/21/20   Henry Monae DPM    CALCIUM CARBONATE/VITAMIN D3 (CALCIUM 500 WITH D ORAL) Take 2 each by mouth once daily at 6am.    Historical Provider, MD   diclofenac sodium (VOLTAREN) 1 % Gel Apply 2 g topically 2 (two) times daily. 7/18/17   Zahida Streeter DPM   estradiol (ESTRACE) 0.01 % (0.1 mg/gram) vaginal cream Place 1 g vaginally 3 (three) times a week. Place by fingertip application before bedtime three times a week (Monday, Wednesday, Friday) 3/20/17 4/19/17  Rosie Monaco MD   flecainide (TAMBOCOR) 150 MG Tab Take 1 tablet (150 mg total) by mouth Daily. 3/25/20 3/25/21  Aletha Gregory NP   multivitamin capsule Take 1 capsule by mouth once daily.    Historical Provider, MD   triamcinolone acetonide 0.1% (KENALOG) 0.1 % cream Apply topically 2 (two) times daily. 5/24/18 6/3/18  Yolette Jack MD   XARELTO 20 mg Tab Take 1 tablet (20 mg total) by mouth once daily. 3/25/20   Aletha Gregory NP       Allergies:  Review of patient's allergies indicates:  No Known Allergies    Social History:  Social History     Tobacco Use    Smoking status: Never Smoker    Smokeless tobacco: Never Used   Substance Use Topics    Alcohol use: Yes     Frequency: 4 or more times a week     Drinks per session: 3 or 4     Binge frequency: Less than monthly     Comment: 2 glasses wine/day    Drug use: No        Review of Systems   Constitutional: Negative for activity change and unexpected weight change.   HENT: Negative for hearing loss, rhinorrhea and trouble swallowing.    Eyes: Positive for visual disturbance. Negative for discharge.   Respiratory: Negative for chest tightness and wheezing.    Cardiovascular: Negative for chest pain and palpitations.   Gastrointestinal: Negative for blood in stool, constipation, diarrhea and vomiting.   Endocrine: Negative for polydipsia and polyuria.   Genitourinary: Negative for difficulty urinating, dysuria, hematuria and menstrual problem.   Musculoskeletal: Positive for neck pain. Negative for  "arthralgias and joint swelling.   Neurological: Negative for weakness and headaches.   Psychiatric/Behavioral: Negative for confusion and dysphoric mood.         Health Maintenance:     Immunizations:   Influenza is not up to date  TDap is up to date, within 10 yrs per pt  Prevnar 5/2018  Shingrix rec today     Cancer Screening:  PAP: Dr. Coulter, sees annually, seen in [place] within the last year, no issues.  Mammogram: 9/2019 benign, can repeat in Sept but pt states she will be in [place]  Colonoscopy: around 2013 wnl per pt  DEXA: 11/2017 osteopenia per pt, repeat due, will order.    Objective:   /84 (BP Location: Left arm, Patient Position: Sitting, BP Method: Medium (Manual))   Pulse 81   Ht 5' 6" (1.676 m)   Wt 62.8 kg (138 lb 7.2 oz)   SpO2 99%   BMI 22.35 kg/m²        General: AAO x3, no apparent distress  HEENT: PERRL, OP clear  CV: RRR, no m/r/g  Pulm: Lungs CTAB, no crackles, no wheezes  Abd: s/NT/ND +BS  Extremities: no c/c/e    Labs:     Lab Results   Component Value Date    WBC 4.16 06/12/2020    HGB 14.2 06/12/2020    HCT 46.3 06/12/2020     (H) 06/12/2020     06/12/2020     Sodium   Date Value Ref Range Status   06/12/2020 138 136 - 145 mmol/L Final     Potassium   Date Value Ref Range Status   06/12/2020 5.3 (H) 3.5 - 5.1 mmol/L Final     Comment:     *No Visible Hemolysis     Chloride   Date Value Ref Range Status   06/12/2020 101 95 - 110 mmol/L Final     CO2   Date Value Ref Range Status   06/12/2020 29 23 - 29 mmol/L Final     Glucose   Date Value Ref Range Status   06/12/2020 88 70 - 110 mg/dL Final     BUN, Bld   Date Value Ref Range Status   06/12/2020 25 (H) 8 - 23 mg/dL Final     Creatinine   Date Value Ref Range Status   06/12/2020 0.9 0.5 - 1.4 mg/dL Final     Calcium   Date Value Ref Range Status   06/12/2020 9.9 8.7 - 10.5 mg/dL Final     Total Protein   Date Value Ref Range Status   06/12/2020 7.3 6.0 - 8.4 g/dL Final     Albumin   Date Value Ref Range Status "   06/12/2020 3.8 3.5 - 5.2 g/dL Final     Total Bilirubin   Date Value Ref Range Status   06/12/2020 0.4 0.1 - 1.0 mg/dL Final     Comment:     For infants and newborns, interpretation of results should be based  on gestational age, weight and in agreement with clinical  observations.  Premature Infant recommended reference ranges:  Up to 24 hours.............<8.0 mg/dL  Up to 48 hours............<12.0 mg/dL  3-5 days..................<15.0 mg/dL  6-29 days.................<15.0 mg/dL       Alkaline Phosphatase   Date Value Ref Range Status   06/12/2020 78 55 - 135 U/L Final     AST   Date Value Ref Range Status   06/12/2020 32 10 - 40 U/L Final     ALT   Date Value Ref Range Status   06/12/2020 18 10 - 44 U/L Final     Anion Gap   Date Value Ref Range Status   06/12/2020 8 8 - 16 mmol/L Final     eGFR if    Date Value Ref Range Status   06/12/2020 >60.0 >60 mL/min/1.73 m^2 Final     eGFR if non    Date Value Ref Range Status   06/12/2020 >60.0 >60 mL/min/1.73 m^2 Final     Comment:     Calculation used to obtain the estimated glomerular filtration  rate (eGFR) is the CKD-EPI equation.        No results found for: LABA1C, HGBA1C  Lab Results   Component Value Date    LDLCALC 121.8 06/12/2020     Lab Results   Component Value Date    TSH 1.269 06/12/2020         Assessment/Plan     Eliz was seen today for follow-up.    Diagnoses and all orders for this visit:    Goiter, nontoxic, multinodular  Had apparent FNA's in the past, seen by Dr. Garcia previously and due for f/u, will refer, likely needs f/u u/s but will defer to endo  -     TSH; Future  -     Ambulatory referral/consult to Endocrinology; Future    Chronic atrial fibrillation  As per HPI  Back in A fib per pt (but has PM)  Plan for f/u echo this week, cont f/u c Dr. Tu Mcgovern    Pleural effusion  Apparently benign per pt and idiopathic per w/u from Pulm.  Will check [place] since pt also had ?pericardial effusion.  S/p  thoracentesis and sx have since resolved, need outside records.  -     [place] Screen w/Reflex; Future    Age related osteoporosis, unspecified pathological fracture presence  Due for f/u DEXA, will order and can f/u c Endo  -     Vitamin D; Future  -     DXA Bone Density Spine And Hip; Future    Screening, anemia, deficiency, iron  -     CBC auto differential; Future    Screening for diabetes mellitus  -     Comprehensive metabolic panel; Future    Exposure to Covid-19 Virus  -     COVID-19 (SARS CoV-2) IgG Antibody; Future    Lipid screening  -     Lipid Panel; Future      HM as above  RTC in 1 year for f/u since seeing subspecialists and also getting care in [place], plans to go Aug to maybe [place], can see me sooner if needed.    Yolette Jack MD  Department of Internal Medicine - [facility]  06/16/2020

## 2020-06-12 ENCOUNTER — TELEPHONE (OUTPATIENT)
Dept: INTERNAL MEDICINE | Facility: CLINIC | Age: 70
End: 2020-06-12

## 2020-06-12 ENCOUNTER — LAB VISIT (OUTPATIENT)
Dept: LAB | Facility: HOSPITAL | Age: 70
End: 2020-06-12
Attending: INTERNAL MEDICINE
Payer: MEDICARE

## 2020-06-12 DIAGNOSIS — Z13.0 SCREENING, ANEMIA, DEFICIENCY, IRON: ICD-10-CM

## 2020-06-12 DIAGNOSIS — Z13.1 SCREENING FOR DIABETES MELLITUS: ICD-10-CM

## 2020-06-12 DIAGNOSIS — R79.9 ABNORMAL FINDING OF BLOOD CHEMISTRY, UNSPECIFIED: ICD-10-CM

## 2020-06-12 DIAGNOSIS — E87.5 HYPERKALEMIA: Primary | ICD-10-CM

## 2020-06-12 DIAGNOSIS — J90 PLEURAL EFFUSION: ICD-10-CM

## 2020-06-12 DIAGNOSIS — Z13.220 LIPID SCREENING: ICD-10-CM

## 2020-06-12 DIAGNOSIS — Z20.822 EXPOSURE TO COVID-19 VIRUS: ICD-10-CM

## 2020-06-12 DIAGNOSIS — M81.0 AGE RELATED OSTEOPOROSIS, UNSPECIFIED PATHOLOGICAL FRACTURE PRESENCE: ICD-10-CM

## 2020-06-12 DIAGNOSIS — E04.2 GOITER, NONTOXIC, MULTINODULAR: ICD-10-CM

## 2020-06-12 LAB
25(OH)D3+25(OH)D2 SERPL-MCNC: 44 NG/ML (ref 30–96)
ALBUMIN SERPL BCP-MCNC: 3.8 G/DL (ref 3.5–5.2)
ALP SERPL-CCNC: 78 U/L (ref 55–135)
ALT SERPL W/O P-5'-P-CCNC: 18 U/L (ref 10–44)
ANION GAP SERPL CALC-SCNC: 8 MMOL/L (ref 8–16)
AST SERPL-CCNC: 32 U/L (ref 10–40)
BASOPHILS # BLD AUTO: 0.04 K/UL (ref 0–0.2)
BASOPHILS NFR BLD: 1 % (ref 0–1.9)
BILIRUB SERPL-MCNC: 0.4 MG/DL (ref 0.1–1)
BUN SERPL-MCNC: 25 MG/DL (ref 8–23)
CALCIUM SERPL-MCNC: 9.9 MG/DL (ref 8.7–10.5)
CHLORIDE SERPL-SCNC: 101 MMOL/L (ref 95–110)
CHOLEST SERPL-MCNC: 224 MG/DL (ref 120–199)
CHOLEST/HDLC SERPL: 2.4 {RATIO} (ref 2–5)
CO2 SERPL-SCNC: 29 MMOL/L (ref 23–29)
CREAT SERPL-MCNC: 0.9 MG/DL (ref 0.5–1.4)
DIFFERENTIAL METHOD: ABNORMAL
EOSINOPHIL # BLD AUTO: 0.1 K/UL (ref 0–0.5)
EOSINOPHIL NFR BLD: 1.9 % (ref 0–8)
ERYTHROCYTE [DISTWIDTH] IN BLOOD BY AUTOMATED COUNT: 19.6 % (ref 11.5–14.5)
EST. GFR  (AFRICAN AMERICAN): >60 ML/MIN/1.73 M^2
EST. GFR  (NON AFRICAN AMERICAN): >60 ML/MIN/1.73 M^2
GLUCOSE SERPL-MCNC: 88 MG/DL (ref 70–110)
HCT VFR BLD AUTO: 46.3 % (ref 37–48.5)
HDLC SERPL-MCNC: 92 MG/DL (ref 40–75)
HDLC SERPL: 41.1 % (ref 20–50)
HGB BLD-MCNC: 14.2 G/DL (ref 12–16)
IMM GRANULOCYTES # BLD AUTO: 0.01 K/UL (ref 0–0.04)
IMM GRANULOCYTES NFR BLD AUTO: 0.2 % (ref 0–0.5)
LDLC SERPL CALC-MCNC: 121.8 MG/DL (ref 63–159)
LYMPHOCYTES # BLD AUTO: 1.1 K/UL (ref 1–4.8)
LYMPHOCYTES NFR BLD: 25.7 % (ref 18–48)
MCH RBC QN AUTO: 30.7 PG (ref 27–31)
MCHC RBC AUTO-ENTMCNC: 30.7 G/DL (ref 32–36)
MCV RBC AUTO: 100 FL (ref 82–98)
MONOCYTES # BLD AUTO: 0.6 K/UL (ref 0.3–1)
MONOCYTES NFR BLD: 13.5 % (ref 4–15)
NEUTROPHILS # BLD AUTO: 2.4 K/UL (ref 1.8–7.7)
NEUTROPHILS NFR BLD: 57.7 % (ref 38–73)
NONHDLC SERPL-MCNC: 132 MG/DL
NRBC BLD-RTO: 0 /100 WBC
PLATELET # BLD AUTO: 194 K/UL (ref 150–350)
PMV BLD AUTO: 11 FL (ref 9.2–12.9)
POTASSIUM SERPL-SCNC: 5.3 MMOL/L (ref 3.5–5.1)
PROT SERPL-MCNC: 7.3 G/DL (ref 6–8.4)
RBC # BLD AUTO: 4.63 M/UL (ref 4–5.4)
SARS-COV-2 IGG SERPLBLD QL IA.RAPID: NEGATIVE
SODIUM SERPL-SCNC: 138 MMOL/L (ref 136–145)
TRIGL SERPL-MCNC: 51 MG/DL (ref 30–150)
TSH SERPL DL<=0.005 MIU/L-ACNC: 1.27 UIU/ML (ref 0.4–4)
WBC # BLD AUTO: 4.16 K/UL (ref 3.9–12.7)

## 2020-06-12 PROCEDURE — 80061 LIPID PANEL: CPT

## 2020-06-12 PROCEDURE — 80053 COMPREHEN METABOLIC PANEL: CPT

## 2020-06-12 PROCEDURE — 86038 ANTINUCLEAR ANTIBODIES: CPT

## 2020-06-12 PROCEDURE — 86769 SARS-COV-2 COVID-19 ANTIBODY: CPT

## 2020-06-12 PROCEDURE — 85025 COMPLETE CBC W/AUTO DIFF WBC: CPT

## 2020-06-12 PROCEDURE — 84443 ASSAY THYROID STIM HORMONE: CPT

## 2020-06-12 PROCEDURE — 82306 VITAMIN D 25 HYDROXY: CPT

## 2020-06-12 PROCEDURE — 86039 ANTINUCLEAR ANTIBODIES (ANA): CPT

## 2020-06-12 PROCEDURE — 36415 COLL VENOUS BLD VENIPUNCTURE: CPT

## 2020-06-12 PROCEDURE — 86235 NUCLEAR ANTIGEN ANTIBODY: CPT | Mod: 59

## 2020-06-13 ENCOUNTER — PATIENT MESSAGE (OUTPATIENT)
Dept: INTERNAL MEDICINE | Facility: CLINIC | Age: 70
End: 2020-06-13

## 2020-06-15 ENCOUNTER — TELEPHONE (OUTPATIENT)
Dept: INTERNAL MEDICINE | Facility: CLINIC | Age: 70
End: 2020-06-15

## 2020-06-15 LAB
ANA PATTERN 1: NORMAL
ANA SER QL IF: POSITIVE
ANA TITR SER IF: NORMAL {TITER}

## 2020-06-15 NOTE — TELEPHONE ENCOUNTER
----- Message from Mechelle Glover sent at 6/15/2020  2:19 PM CDT -----  Regarding: self [phone]  Pt was very upset b/c she was unable to have a bone density test done before the 6/22 Endocrinologist appt. Pt states it makes no sense to see the endocrinologist without having the bone density test. Please call and advise.

## 2020-06-15 NOTE — TELEPHONE ENCOUNTER
Pt notified of all lab results in detail.  All questions answered.  Advised to call the office to schedule her DEXA scan before her appt c Dr. Zapien.

## 2020-06-15 NOTE — TELEPHONE ENCOUNTER
----- Message from Louise Wallace sent at 6/15/2020  1:24 PM CDT -----  Regarding: Patient @ [phone]  Good Afternoon  Patient would like to discuss testing before seeing  ENDOCRINOLOGY.    Please call and advise

## 2020-06-16 ENCOUNTER — HOSPITAL ENCOUNTER (OUTPATIENT)
Dept: RADIOLOGY | Facility: CLINIC | Age: 70
Discharge: HOME | End: 2020-06-16
Attending: INTERNAL MEDICINE
Payer: MEDICARE

## 2020-06-16 DIAGNOSIS — M81.0 AGE RELATED OSTEOPOROSIS, UNSPECIFIED PATHOLOGICAL FRACTURE PRESENCE: ICD-10-CM

## 2020-06-16 PROCEDURE — 77080 DXA BONE DENSITY AXIAL: CPT | Mod: 26,,, | Performed by: INTERNAL MEDICINE

## 2020-06-16 PROCEDURE — 77080 DXA BONE DENSITY AXIAL: CPT | Mod: TC

## 2020-06-16 PROCEDURE — 77080 DEXA BONE DENSITY SPINE HIP: ICD-10-PCS | Mod: 26,,, | Performed by: INTERNAL MEDICINE

## 2020-06-17 LAB
ANTI SM ANTIBODY: 0.07 RATIO (ref 0–0.99)
ANTI SM/RNP ANTIBODY: 0.09 RATIO (ref 0–0.99)
ANTI-SM INTERPRETATION: NEGATIVE
ANTI-SM/RNP INTERPRETATION: NEGATIVE
ANTI-SSA ANTIBODY: 0.05 RATIO (ref 0–0.99)
ANTI-SSA INTERPRETATION: NEGATIVE
ANTI-SSB ANTIBODY: 0.08 RATIO (ref 0–0.99)
ANTI-SSB INTERPRETATION: NEGATIVE
DSDNA AB SER-ACNC: NORMAL [IU]/ML

## 2020-06-19 ENCOUNTER — PATIENT OUTREACH (OUTPATIENT)
Dept: ADMINISTRATIVE | Facility: OTHER | Age: 70
End: 2020-06-19

## 2020-06-19 ENCOUNTER — LAB VISIT (OUTPATIENT)
Dept: LAB | Facility: HOSPITAL | Age: 70
End: 2020-06-19
Attending: INTERNAL MEDICINE
Payer: MEDICARE

## 2020-06-19 DIAGNOSIS — E87.5 HYPERKALEMIA: ICD-10-CM

## 2020-06-19 LAB — POTASSIUM SERPL-SCNC: 5 MMOL/L (ref 3.5–5.1)

## 2020-06-19 PROCEDURE — 84132 ASSAY OF SERUM POTASSIUM: CPT

## 2020-06-19 PROCEDURE — 36415 COLL VENOUS BLD VENIPUNCTURE: CPT

## 2020-06-22 ENCOUNTER — OFFICE VISIT (OUTPATIENT)
Dept: ENDOCRINOLOGY | Facility: CLINIC | Age: 70
End: 2020-06-22
Payer: MEDICARE

## 2020-06-22 VITALS
RESPIRATION RATE: 16 BRPM | WEIGHT: 141 LBS | DIASTOLIC BLOOD PRESSURE: 80 MMHG | SYSTOLIC BLOOD PRESSURE: 110 MMHG | BODY MASS INDEX: 22.66 KG/M2 | HEART RATE: 64 BPM | HEIGHT: 66 IN

## 2020-06-22 DIAGNOSIS — M81.0 AGE RELATED OSTEOPOROSIS, UNSPECIFIED PATHOLOGICAL FRACTURE PRESENCE: ICD-10-CM

## 2020-06-22 DIAGNOSIS — E55.9 VITAMIN D DEFICIENCY: ICD-10-CM

## 2020-06-22 DIAGNOSIS — E04.2 GOITER, NONTOXIC, MULTINODULAR: Primary | ICD-10-CM

## 2020-06-22 PROCEDURE — 99999 PR PBB SHADOW E&M-EST. PATIENT-LVL V: ICD-10-PCS | Mod: PBBFAC,,, | Performed by: INTERNAL MEDICINE

## 2020-06-22 PROCEDURE — 99999 PR PBB SHADOW E&M-EST. PATIENT-LVL V: CPT | Mod: PBBFAC,,, | Performed by: INTERNAL MEDICINE

## 2020-06-22 PROCEDURE — 99215 OFFICE O/P EST HI 40 MIN: CPT | Mod: PBBFAC | Performed by: INTERNAL MEDICINE

## 2020-06-22 PROCEDURE — 99204 OFFICE O/P NEW MOD 45 MIN: CPT | Mod: S$PBB,,, | Performed by: INTERNAL MEDICINE

## 2020-06-22 PROCEDURE — 99204 PR OFFICE/OUTPT VISIT, NEW, LEVL IV, 45-59 MIN: ICD-10-PCS | Mod: S$PBB,,, | Performed by: INTERNAL MEDICINE

## 2020-06-22 RX ORDER — FLECAINIDE ACETATE 150 MG/1
150 TABLET ORAL
COMMUNITY
Start: 2020-06-09 | End: 2020-09-07

## 2020-06-22 RX ORDER — FERROUS SULFATE, DRIED 160(50) MG
1 TABLET, EXTENDED RELEASE ORAL
COMMUNITY

## 2020-06-22 NOTE — ASSESSMENT & PLAN NOTE
Repeat US as last done in 2017  Unless significant change in size of nodules will plan to continue monitoring with imaging given previous stability and negative FNA as well as lack of risk factors for thyroid cancer

## 2020-06-22 NOTE — ASSESSMENT & PLAN NOTE
Decline in BMD on recent DXA and meets criteria for treatment  Discussed options including oral and IV bisphosphonate or Prolia as well as risks/benefits of each  I think Reclast would be easiest option given she travels between here and [place] and this could interrupt timing of Prolia injections  May need wisdom tooth pulled and advised she do this before starting treatment    Will perform secondary evaluation

## 2020-06-22 NOTE — PROGRESS NOTES
Eliz Hartman is a 69 y.o. female with a fib referred by Dr. Cabrera for evaluation of multinodular goiter, osteoporosis    Previously seen by  with last visit >3 years ago, new to me    History of Present Illness  Thyroid nodules  Found in her early 50s   She is not concerned about the nodules   She spends 1/2 year here and 1/2 in [place]     Last Thyroid US 1/2017:  Right Heterogenous nodule near isthmus 1.2x0.7x1.5 from 1.1x0.6x1.1 cm  2 sub cm solid nodules  right lobe  +subcm cysts   Left heterogenous 0.7 cm +smaller nodules   No LAD   Total volume of the gland had increased , right nodule increased in size      Us from [place] 2013  Right 1.5 cm and second was 1.1     She had 3 fnas in the past and all neg  (no records from this available today)     tpo neg   Us from [place] 2012 increase involume of right nodules and decrease of volume of others when compared to 2010     At last visit with Dr. Garcia they elected to monitor the nodules given previous negative biopsies and stability over time. She has not had repeat US since that visit    No difficulty breathing swallowing   No voice changes  No FH of thyroid cancer  No personal history of radiation treatment or exposure      No signs or symptoms of hyper or hypothyroidism     No weight changes  No bowel changes  No heat or cold intolerance   No hair nail or skin changes  No cp, palpations or sob         Lab Results   Component Value Date    TSH 1.269 06/12/2020       With regards to osteoporosis:   current medication: fosamax started early 2017- took for less than a year     One injection of prolia in [place] in 2018 and then advised to have no further treatment due to concern for side effects    DXA 6/2020:   Lumbar spine (L1-L4):   BMD is 1.006 g/cm2, T-score is -0.4, and Z-score is 1.8. The TBS T-score (L1-L4) is -1.2.  Total hip: BMD is 0.676 g/cm2, T-score is -2.2, and Z-score is -0.6.  Femoral neck:BMD is 0.554 g/cm2, T-score is -2.7, and Z-score is  -0.8.  There is a 14% risk of a major osteoporotic fracture and a 4.1% risk of hip fracture in the next 10 years (FRAX adjusted for TBS).  Using the TBS adjusted FRAX there is 11.2% risk of a major osteoporotic fracture and a 2.2% risk of hip fracture in the next 10 years.  Compared with previous DXA, BMD at the lumbar spine has decreased 7%, and the BMD at the total hip has decreased 8.4%.       Takes Calcium +d supplements 500-400 daily     Weight bearing exercise?   Walking, tennis  Recent falls? No      Left foot stress fracture   Right foot fracture after a fall   No significant height loss (>2 inches)     tob use ?  None       etoh use? Social      No current diarrhea or h/o malabsorption  No steroids        Current Outpatient Medications:     ammonium lactate 12 % Crea, Apply twice daily to affected parts both feet as needed., Disp: 140 g, Rfl: 11    CALCIUM CARBONATE/VITAMIN D3 (CALCIUM 500 WITH D ORAL), Take 2 each by mouth once daily at 6am., Disp: , Rfl:     flecainide (TAMBOCOR) 150 MG Tab, Take 1 tablet (150 mg total) by mouth Daily., Disp: 30 tablet, Rfl: 11    multivitamin capsule, Take 1 capsule by mouth once daily., Disp: , Rfl:     rivaroxaban (XARELTO) 20 mg Tab, Take 20 mg by mouth., Disp: , Rfl:     XARELTO 20 mg Tab, Take 1 tablet (20 mg total) by mouth once daily., Disp: 30 tablet, Rfl: 11    calcium-vitamin D3 (OS-SYLVIE 500 + D3) 500 mg(1,250mg) -200 unit per tablet, Take 1 tablet by mouth., Disp: , Rfl:     diclofenac sodium (VOLTAREN) 1 % Gel, Apply 2 g topically 2 (two) times daily. (Patient not taking: Reported on 6/22/2020), Disp: 500 g, Rfl: 1    estradiol (ESTRACE) 0.01 % (0.1 mg/gram) vaginal cream, Place 1 g vaginally 3 (three) times a week. Place by fingertip application before bedtime three times a week (Monday, Wednesday, Friday), Disp: 45 g, Rfl: 3    flecainide (TAMBOCOR) 150 MG Tab, Take 150 mg by mouth., Disp: , Rfl:     multivitamin capsule, Take 1 capsule by mouth.,  Disp: , Rfl:     triamcinolone acetonide 0.1% (KENALOG) 0.1 % cream, Apply topically 2 (two) times daily., Disp: 15 g, Rfl: 0    Review of Systems   Constitutional: Negative for fever and unexpected weight change.   Eyes: Negative for pain.   Respiratory: Negative for shortness of breath.    Cardiovascular: Negative for chest pain and leg swelling.   Gastrointestinal: Negative for abdominal pain.   Genitourinary: Negative for dysuria.   Musculoskeletal: Negative for arthralgias.   Skin: Negative for rash.   Neurological: Negative for headaches.   Psychiatric/Behavioral: Negative for confusion.       Objective:     Vitals:    06/22/20 1507   BP: 110/80   Pulse: 64   Resp: 16     Wt Readings from Last 3 Encounters:   06/22/20 64 kg (140 lb 15.8 oz)   06/11/20 62.8 kg (138 lb 7.2 oz)   05/21/20 75.3 kg (166 lb)     Body mass index is 22.76 kg/m².  Physical Exam  Constitutional:       Appearance: She is well-developed.   HENT:      Head: Normocephalic.      Right Ear: External ear normal.      Left Ear: External ear normal.   Eyes:      Conjunctiva/sclera: Conjunctivae normal.      Pupils: Pupils are equal, round, and reactive to light.   Neck:      Thyroid: No thyromegaly.      Trachea: No tracheal deviation.   Cardiovascular:      Rate and Rhythm: Normal rate and regular rhythm.      Heart sounds: No murmur.      Comments: No LE edema  Pulmonary:      Effort: Pulmonary effort is normal.      Breath sounds: Normal breath sounds.   Abdominal:      General: There is no distension.      Palpations: Abdomen is soft. There is no mass.      Tenderness: There is no abdominal tenderness.      Hernia: No hernia is present.   Skin:     General: Skin is warm.      Findings: No rash.      Comments: No nodules   Neurological:      Mental Status: She is alert and oriented to person, place, and time.   Psychiatric:         Judgment: Judgment normal.         Labs    Chemistry        Component Value Date/Time     06/12/2020 0834     K 5.0 06/19/2020 1555     06/12/2020 0834    CO2 29 06/12/2020 0834    BUN 25 (H) 06/12/2020 0834    CREATININE 0.9 06/12/2020 0834    GLU 88 06/12/2020 0834        Component Value Date/Time    CALCIUM 9.9 06/12/2020 0834    ALKPHOS 78 06/12/2020 0834    AST 32 06/12/2020 0834    ALT 18 06/12/2020 0834    BILITOT 0.4 06/12/2020 0834    ESTGFRAFRICA >60.0 06/12/2020 0834    EGFRNONAA >60.0 06/12/2020 0834        Vit D, 25-Hydroxy   Date Value Ref Range Status   06/12/2020 44 30 - 96 ng/mL Final     Comment:     Vitamin D deficiency.........<10 ng/mL                              Vitamin D insufficiency......10-29 ng/mL       Vitamin D sufficiency........> or equal to 30 ng/mL  Vitamin D toxicity............>100 ng/mL             Assessment and Plan     Goiter, nontoxic, multinodular  Repeat US as last done in 2017  Unless significant change in size of nodules will plan to continue monitoring with imaging given previous stability and negative FNA as well as lack of risk factors for thyroid cancer    Osteoporosis  Decline in BMD on recent DXA and meets criteria for treatment  Discussed options including oral and IV bisphosphonate or Prolia as well as risks/benefits of each  I think Reclast would be easiest option given she travels between here and [place] and this could interrupt timing of Prolia injections  May need wisdom tooth pulled and advised she do this before starting treatment    Will perform secondary evaluation    Vitamin D deficiency  Recent vit D at goal  Continue current supplementation    Side effects incl post-infusion myalgias, ONJ and association with subtrochanteric fractures reviewed.  Advise to see the dentist regularly, notify dentist of using this medication and avoid non-emergent dental implants and extractions.  Also advise to contact us if develops new, persistent thigh or groin pain.    Side effects of Prolia reviewed, including risk of hypocalcemia, eczema/skin complications and  possible increased risk of infections.  Also reviewed association with ONJ and rare atypical femur fractures.  Advise to see the dentist regularly, notify dentist of using this medication and avoid non-emergent dental implants and extractions.  Also advise to contact us if develops new, persistent thigh or groin pain.      Will mail letter with all results    RTC 1 year    Silva Zapien MD

## 2020-06-22 NOTE — PATIENT INSTRUCTIONS
DXA 6/2020:   Lumbar spine (L1-L4):   BMD is 1.006 g/cm2, T-score is -0.4, and Z-score is 1.8. The TBS T-score (L1-L4) is -1.2.     Total hip: BMD is 0.676 g/cm2, T-score is -2.2, and Z-score is -0.6.     Femoral neck:BMD is 0.554 g/cm2, T-score is -2.7, and Z-score is -0.8.     There is a 14% risk of a major osteoporotic fracture and a 4.1% risk of hip fracture in the next 10 years (FRAX adjusted for TBS).     Using the TBS adjusted FRAX there is 11.2% risk of a major osteoporotic fracture and a 2.2% risk of hip fracture in the next 10 years.     Compared with previous DXA, BMD at the lumbar spine has decreased 7%, and the BMD at the total hip has decreased 8.4%.      For calcium intake:  I advise you get 1200 mg per day of calcium, split up over the day into 2 to 4 divided doses.  This amount includes calcium from both dietary sources and/or calcium supplements, and it is best to get smaller amounts throughout the day rather than all of the calcium at one time; this allows for better absorption of the calcium.     To estimate calcium content from a nutrition label, the label lists the % calcium in that food.   For example, the label for an 8 oz glass of milk lists the calcium content as 30%.  This is equivalent to 300 mg of calcium (multiply the % by 10 to get the mg of calcium per serving).  It is best to try to get the majority of calcium intake from the diet.  I've included a table below of some calcium-rich foods.    If you are not getting enough calcium in the diet, then you can add low doses of calcium supplements.  For calcium supplements, your body can only absorb about 500-600 mg of calcium at one time.  Thus, it is best to split the tablets up over the course of a day.  It is also best to avoid taking calcium supplements at the same time as a calcium-rich food to maximize your calcium absorption.  Calcium carbonate is best taken with or after a meal.  Calcium citrate can be taken on an empty stomach or  with/after a meal.  Please look at any multivitamin you are taking as these often usually contain calcium as well.    Estimated Calcium Content of Foods:  Produce  Serving Size Estimated Calcium*    Jose greens, frozen 8 oz 360 mg   Broccoli la nena 8 oz 200 mg   Kale, frozen 8 oz 180 mg   Soy Beans, green, boiled 8 oz 175 mg   Bok Polly, cooked, boiled 8 oz 160 mg   Figs, dried 2 figs 65 mg   Broccoli, fresh, cooked 8 oz 60 mg   Oranges 1 whole 55 mg   Seafood Serving Size Estimated Calcium*    Sardines, canned with bones 3 oz 325 mg   [place], canned with bones 3 oz 180 mg   Shrimp, canned 3 oz 125 mg   Dairy Serving Size Estimated Calcium*    Ricotta, part-skim 4 oz 335 mg   Yogurt, plain, low-fat 6 oz 310 mg   Milk, skim, low-fat, whole 8 oz 300 mg   Yogurt with fruit, low-fat 6 oz 260 mg   Mozzarella, part-skim 1 oz 210 mg   Cheddar 1 oz 205 mg   Yogurt, Greek 6 oz 200 mg   American Cheese 1 oz 195 mg   Feta Cheese 4 oz 140 mg   Cottage Cheese, 2% 4 oz 105 mg   Frozen yogurt, vanilla 8 oz 105 mg   Ice Cream, vanilla 8 oz 85 mg   Parmesan 1 tbsp 55 mg   Fortified Food Serving Size Estimated Calcium*   [place] milk, rice milk or soy milk, fortified 8 oz 300 mg   Orange juice and other fruit juices, fortified 8 oz 300 mg   Tofu, prepared with calcium 4 oz 205 mg   Waffle, frozen, fortified 2 pieces 200 mg   Oatmeal, fortified 1 packet 140 mg   English muffin, fortified 1 muffin 100 mg   Cereal, fortified 8 oz 100-1,000 mg   Other Serving Size Estimated Calcium*   Mac & cheese, frozen 1 package 325 mg   Pizza, cheese, frozen 1 serving 115 mg   Pudding, chocolate, prepared with 2% milk 4 oz 160 mg   Beans, baked, canned 4 oz 160 mg   *The calcium content listed for most foods is estimated and can vary due to multiple factors. Check the food label to determine how much calcium is in a particular product.  If you read the nutrition label for a food source, it lists the % calcium in that food.  For an 8 oz glass of milk,  for example, the label states calcium 30%.  This is equivalent to 300 mg of calcium (multiply the listed number by 10).   **Table from the National Osteopor[employer] Foundation

## 2020-06-22 NOTE — LETTER
June 22, 2020      Yolette Jack MD  [address]  [facility] [address]           [facility] - Endocrinology 6th [place]  [address]  [facility] [address]  Phone: [phone]  Fax: [phone]          Patient: Eliz Hartman   MR Number: [id number]   YOB: 1950   Date of Visit: 6/22/2020       Dear Dr. Cabrera:    Thank you for referring Eliz Hartman to me for evaluation. Attached you will find relevant portions of my assessment and plan of care.    If you have questions, please do not hesitate to call me. I look forward to following Eliz Hartman along with you.    Sincerely,    Silva Zapien MD    Enclosure  CC:  No Recipients    If you would like to receive this communication electronically, please contact [email or web address] or [phone] to request more information on [employer] access.    For providers and/or their staff who would like to refer a patient to [facility], please contact us through our one-stop-shop provider referral line, [facility], at [phone].    If you feel you have received this communication in error or would no longer like to receive these types of communications, please e-mail [email or web address]

## 2020-06-23 ENCOUNTER — PATIENT MESSAGE (OUTPATIENT)
Dept: ENDOCRINOLOGY | Facility: CLINIC | Age: 70
End: 2020-06-23

## 2020-06-23 ENCOUNTER — TELEPHONE (OUTPATIENT)
Dept: ENDOCRINOLOGY | Facility: CLINIC | Age: 70
End: 2020-06-23

## 2020-06-26 ENCOUNTER — HOSPITAL ENCOUNTER (OUTPATIENT)
Dept: RADIOLOGY | Facility: HOSPITAL | Age: 70
Discharge: HOME | End: 2020-06-26
Attending: INTERNAL MEDICINE
Payer: MEDICARE

## 2020-06-26 DIAGNOSIS — E04.2 GOITER, NONTOXIC, MULTINODULAR: ICD-10-CM

## 2020-06-26 PROCEDURE — 76536 US SOFT TISSUE HEAD NECK THYROID: ICD-10-PCS | Mod: 26,,, | Performed by: RADIOLOGY

## 2020-06-26 PROCEDURE — 76536 US EXAM OF HEAD AND NECK: CPT | Mod: 26,,, | Performed by: RADIOLOGY

## 2020-06-26 PROCEDURE — 76536 US EXAM OF HEAD AND NECK: CPT | Mod: TC

## 2020-06-27 ENCOUNTER — PATIENT MESSAGE (OUTPATIENT)
Dept: ENDOCRINOLOGY | Facility: CLINIC | Age: 70
End: 2020-06-27

## 2020-07-01 ENCOUNTER — TELEPHONE (OUTPATIENT)
Dept: ENDOCRINOLOGY | Facility: CLINIC | Age: 70
End: 2020-07-01

## 2020-07-01 NOTE — TELEPHONE ENCOUNTER
Called patient to discuss osteoporosis options. Discussed options including Prolia, reclast and monthly risedronate. She remains undecided but will consider and let me know if would like to start a medication      She has contacted medical records about discs of US and DXA

## 2020-08-05 ENCOUNTER — OFFICE VISIT (OUTPATIENT)
Dept: INTERNAL MEDICINE | Facility: CLINIC | Age: 70
End: 2020-08-05
Payer: MEDICARE

## 2020-08-05 DIAGNOSIS — H01.00A BLEPHARITIS OF UPPER AND LOWER EYELIDS OF BOTH EYES, UNSPECIFIED TYPE: Primary | ICD-10-CM

## 2020-08-05 DIAGNOSIS — H01.00B BLEPHARITIS OF UPPER AND LOWER EYELIDS OF BOTH EYES, UNSPECIFIED TYPE: Primary | ICD-10-CM

## 2020-08-05 PROCEDURE — 99212 OFFICE O/P EST SF 10 MIN: CPT | Mod: 95,,, | Performed by: NURSE PRACTITIONER

## 2020-08-05 PROCEDURE — 99212 PR OFFICE/OUTPT VISIT, EST, LEVL II, 10-19 MIN: ICD-10-PCS | Mod: 95,,, | Performed by: NURSE PRACTITIONER

## 2020-08-05 RX ORDER — ERYTHROMYCIN 5 MG/G
OINTMENT OPHTHALMIC 2 TIMES DAILY
Qty: 3.5 G | Refills: 0 | Status: SHIPPED | OUTPATIENT
Start: 2020-08-05

## 2020-08-05 NOTE — PATIENT INSTRUCTIONS
Blepharitis    Blepharitis is an inflammation of the eyelid. It results in swelling of the eyelids, and it is usually caused by a bacterial infection or a skin condition. Blepharitis is a common eye condition. There are two types. Anterior blepharitis occurs where the eyelashes are attached (outside front edge of the eye). Posterior blepharitis affects the inner edge of the eyelid that touches the eyeball.  In addition to swollen eyelids, symptoms of blepharitis can include thick, yellow, dandruff-like scales that stick to the eyelid. There may be oily patches on the eyelid. The eyelashes may be crusted (with dandruff-like scales) when you wake up from sleeping. The irritated area may itch. The eyelids may be red. The eyes can be red and burn or sting. The eyes may tear a lot, or be dry. You can become sensitive to light or have blurred vision. Symptoms of blepharitis can cause irritability.  Blepharitis is a chronic condition and difficult to cure. Even with successful treatment, recurrences are common. Good hygiene and home treatments (in the Home care section below) can improve your condition.  Causes  Causes of blepharitis may include:  · Problems with the oil glands in the eyelid (meibomian glands)  · Dandruff of the scalp and eyebrows (seborrheic dermatitis)  · Acne rosacea (a skin condition that causes redness of the face, and other symptoms)  · Eyelash mites (tiny organisms in the eyelash follicles)  · Allergic reactions to cosmetics or medicines  Home care  Medicine: The healthcare provider may prescribe an antibiotic eye drops or ointment, artificial tears, and/or steroid eye drops. Follow all instructions for using these medicines. Use all medicines as directed. If you have pain, take medicine as advised by the healthcare provider.  · Wash your hands carefully with soap and warm water before and after caring for your eyes.  · Apply a warm compress or a warm, moist washcloth to the eyelids for 1 minute,  2 to 3 times a day, to loosen the crust. Then, wipe away scales or crust from the eyelids.  · After applying the warm compress, gently scrub the base of the eyelashes for almost 15 seconds per eyelid. To do this, close your eyes and use a moist eyelid cleansing wipe, clean washcloth, or cotton swab. Ask your healthcare provider about products (such as nonirritating baby shampoo) to use to help clean the eyelids.  · You may be instructed to gently massage your eyelids to help unblock the eyelid glands. Follow all instructions given by the healthcare provider.  · Unless told otherwise, on a regular basis, with eyes closed, clean your eyelids as directed by the healthcare provider. Blepharitis can be an ongoing problem.  · Do not wear eye makeup until the inflammation goes away, or as directed by your healthcare provider.  · Unless told otherwise, stop using contact lenses until you complete treatment for the condition.  · Wash your hands regularly to help prevent dirt and bacteria from coming in contact with your eyelid.  Follow-up care  Follow up with your healthcare provider, or as advised. Your healthcare provider may refer you to an eye specialist (an optometrist or ophthalmologist) for further evaluation and treatment.  When to seek medical advice  Call your healthcare provider right away if any of these occur:  · Increase in redness of the white part of the eye  · Increase in swelling, redness, irritation, or pain of the eyelids  · Eye pain  · Change in vision (trouble seeing or blurring)  · Drainage (pus, blood) from the eyelid  · Fever of 100.4ºF (38ºC) or higher, or as directed by your healthcare provider  Date Last Reviewed: 10/9/2015  © 3509-4869 [employer]. [address] [place] [address]. All rights reserved. This information is not intended as a substitute for professional medical care. Always follow your healthcare professional's instructions.        Treating Blepharitis:  Medicine and Follow-Up  Medicine    Your eye doctor may prescribe eye drops or an ointment. These will help ease redness, swelling, and irritation. When using these medicines, make sure that the tip of the tube or bottle doesnt touch your eyelid. Your doctor may also prescribe oral antibiotics or an antibiotic ointment. Or he or she may prescribe eye drops with cortisone. These medicines can help clear up a bacterial infection, a cyst, or a stye. If you take medicine, you must still use warm compresses and eyelid scrubs as recommended by your doctor.  Follow-up appointments  Your eye doctor needs to recheck your eyes during your treatment. This is to make sure the redness and swelling (inflammation) is under control. Regular eye exams are also the best way to prevent other eye problems. Many eye diseases have no symptoms until the eye is already damaged. Finding and treating a problem early can help prevent something more severe.  Date Last Reviewed: 6/6/2015  © 0399-4073 [employer]. [address] [place] [address], [place] [address]. All rights reserved. This information is not intended as a substitute for professional medical care. Always follow your healthcare professional's instructions.

## 2020-08-05 NOTE — PROGRESS NOTES
INTERNAL MEDICINE URGENT CARE NOTE    CHIEF COMPLAINT     No chief complaint on file.      HPI     Eliz Hartman is a 69 y.o. female with chronic atrial fibrillation, goiter, vit d def, gerd and osteoporosis who presents for an urgent visit today.    The patient location is: [place], [place]   The chief complaint leading to consultation is: eye itching and swelling     Visit type: audiovisual    Face to Face time with patient: 15 minutes of total time spent on the encounter, which includes face to face time and non-face to face time preparing to see the patient (eg, review of tests), Obtaining and/or reviewing separately obtained history, Documenting clinical information in the electronic or other health record, Independently interpreting results (not separately reported) and communicating results to the patient/family/caregiver, or Care coordination (not separately reported).         Each patient to whom he or she provides medical services by telemedicine is:  (1) informed of the relationship between the physician and patient and the respective role of any other health care provider with respect to management of the patient; and (2) notified that he or she may decline to receive medical services by telemedicine and may withdraw from such care at any time.    Notes:     Here with c/o irritation to the skin surrounding the eyes. With swelling and dryness near the upper lash lines. Used OTC eye scrub. Denies vision changes. No Upper resp symptoms.   Also with dry scalp.       Past Medical History:  Past Medical History:   Diagnosis Date    Atrial fibrillation     Multiple thyroid nodules     Osteoporosis     Pacemaker        Home Medications:  Prior to Admission medications    Medication Sig Start Date End Date Taking? Authorizing Provider   ammonium lactate 12 % Crea Apply twice daily to affected parts both feet as needed. 5/21/20   Henry Monae DPM   CALCIUM CARBONATE/VITAMIN D3 (CALCIUM 500 WITH D ORAL) Take 2 each  by mouth once daily at 6am.    Historical Provider, MD   calcium-vitamin D3 (OS-SYLVIE 500 + D3) 500 mg(1,250mg) -200 unit per tablet Take 1 tablet by mouth.    Historical Provider, MD   diclofenac sodium (VOLTAREN) 1 % Gel Apply 2 g topically 2 (two) times daily.  Patient not taking: Reported on 6/22/2020 7/18/17   Zahida Streeter DPM   estradiol (ESTRACE) 0.01 % (0.1 mg/gram) vaginal cream Place 1 g vaginally 3 (three) times a week. Place by fingertip application before bedtime three times a week (Monday, Wednesday, Friday) 3/20/17 6/11/20  Rosie Monaco MD   flecainide (TAMBOCOR) 150 MG Tab Take 1 tablet (150 mg total) by mouth Daily. 3/25/20 3/25/21  Aletha Gregory NP   flecainide (TAMBOCOR) 150 MG Tab Take 150 mg by mouth. 6/9/20 9/7/20  Historical Provider, MD   multivitamin capsule Take 1 capsule by mouth once daily.    Historical Provider, MD   multivitamin capsule Take 1 capsule by mouth.    Historical Provider, MD   rivaroxaban (XARELTO) 20 mg Tab Take 20 mg by mouth. 6/9/20 9/7/20  Historical Provider, MD   triamcinolone acetonide 0.1% (KENALOG) 0.1 % cream Apply topically 2 (two) times daily. 5/24/18 6/11/20  Yolette Jack MD   XARELTO 20 mg Tab Take 1 tablet (20 mg total) by mouth once daily. 3/25/20   Aletha Gregory NP       Review of Systems:  Review of Systems   Constitutional: Negative for activity change and unexpected weight change.   HENT: Negative for hearing loss, rhinorrhea and trouble swallowing.    Eyes: Negative for discharge and visual disturbance.   Respiratory: Negative for chest tightness and wheezing.    Cardiovascular: Negative for chest pain and palpitations.   Gastrointestinal: Negative for blood in stool, constipation, diarrhea and vomiting.   Endocrine: Negative for polydipsia and polyuria.   Genitourinary: Negative for difficulty urinating, dysuria, hematuria and menstrual problem.   Musculoskeletal: Negative for arthralgias, joint swelling and neck pain.    Neurological: Negative for weakness and headaches.   Psychiatric/Behavioral: Negative for confusion and dysphoric mood.       Health Maintainence:   Immunizations:  Health Maintenance       Date Due Completion Date    Hepatitis C Screening 1950 ---    TETANUS VACCINE 09/10/1966 ---    Shingles Vaccine (2 of 3) 12/19/2016 10/24/2016    Pneumococcal Vaccine (65+ Low/Medium Risk) (2 of 2 - PPSV23) 05/24/2019 5/24/2018    Influenza Vaccine (1) 09/01/2020 ---    Mammogram 09/27/2021 9/27/2019    Override on 1/12/2017: Done ([facility])    Colorectal Cancer Screening 01/01/2023 1/1/2013 (Done)    Override on 1/1/2013: Done (done 5 yrs ago and no polyps per pt, can repeat in 2023)    DEXA SCAN 06/16/2023 6/16/2020    Override on 11/1/2017: Done (done in [place], reports osteopenia)    Lipid Panel 06/12/2025 6/12/2020           PHYSICAL EXAM     There were no vitals taken for this visit.    Physical Exam  Constitutional:       Appearance: Normal appearance. She is normal weight.   Eyes:      General:         Right eye: Discharge present.         Left eye: Discharge present.     Extraocular Movements: Extraocular movements intact.      Conjunctiva/sclera: Conjunctivae normal.     Neurological:      Mental Status: She is alert.         LABS     No results found for: LABA1C, HGBA1C  CMP  Sodium   Date Value Ref Range Status   06/22/2020 140 136 - 145 mmol/L Final     Potassium   Date Value Ref Range Status   06/22/2020 4.3 3.5 - 5.1 mmol/L Final     Chloride   Date Value Ref Range Status   06/22/2020 102 95 - 110 mmol/L Final     CO2   Date Value Ref Range Status   06/22/2020 30 (H) 23 - 29 mmol/L Final     Glucose   Date Value Ref Range Status   06/22/2020 79 70 - 110 mg/dL Final     BUN, Bld   Date Value Ref Range Status   06/22/2020 14 8 - 23 mg/dL Final     Creatinine   Date Value Ref Range Status   06/22/2020 0.8 0.5 - 1.4 mg/dL Final     Calcium   Date Value Ref Range Status   06/22/2020 9.3 8.7 - 10.5 mg/dL Final      Total Protein   Date Value Ref Range Status   06/12/2020 7.3 6.0 - 8.4 g/dL Final     Albumin   Date Value Ref Range Status   06/22/2020 3.7 3.5 - 5.2 g/dL Final     Total Bilirubin   Date Value Ref Range Status   06/12/2020 0.4 0.1 - 1.0 mg/dL Final     Comment:     For infants and newborns, interpretation of results should be based  on gestational age, weight and in agreement with clinical  observations.  Premature Infant recommended reference ranges:  Up to 24 hours.............<8.0 mg/dL  Up to 48 hours............<12.0 mg/dL  3-5 days..................<15.0 mg/dL  6-29 days.................<15.0 mg/dL       Alkaline Phosphatase   Date Value Ref Range Status   06/12/2020 78 55 - 135 U/L Final     AST   Date Value Ref Range Status   06/12/2020 32 10 - 40 U/L Final     ALT   Date Value Ref Range Status   06/12/2020 18 10 - 44 U/L Final     Anion Gap   Date Value Ref Range Status   06/22/2020 8 8 - 16 mmol/L Final     eGFR if    Date Value Ref Range Status   06/22/2020 >60.0 >60 mL/min/1.73 m^2 Final     eGFR if non    Date Value Ref Range Status   06/22/2020 >60.0 >60 mL/min/1.73 m^2 Final     Comment:     Calculation used to obtain the estimated glomerular filtration  rate (eGFR) is the CKD-EPI equation.        Lab Results   Component Value Date    WBC 4.16 06/12/2020    HGB 14.2 06/12/2020    HCT 46.3 06/12/2020     (H) 06/12/2020     06/12/2020     Lab Results   Component Value Date    CHOL 224 (H) 06/12/2020    CHOL 196 06/15/2018     Lab Results   Component Value Date    HDL 92 (H) 06/12/2020    HDL 78 (H) 06/15/2018     Lab Results   Component Value Date    LDLCALC 121.8 06/12/2020    LDLCALC 104.8 06/15/2018     Lab Results   Component Value Date    TRIG 51 06/12/2020    TRIG 66 06/15/2018     Lab Results   Component Value Date    CHOLHDL 41.1 06/12/2020    CHOLHDL 39.8 06/15/2018     Lab Results   Component Value Date    TSH 1.269 06/12/2020        ASSESSMENT/PLAN     Eliz Hartman is a 69 y.o. female     Blepharitis of upper and lower eyelids of both eyes, unspecified type- will cotn eye washes bid and apply erythromycin ointment. Warm compresses every 6 hours.   -     erythromycin (ROMYCIN) ophthalmic ointment; Place into both eyes 2 (two) times a day.  Dispense: 3.5 g; Refill: 0    Follow up with PCP     Patient education provided from [facility]. Patient was counseled on when and how to seek emergent care.       Aletha RAJPUT, KAUR, FNP-c   Department of Internal Medicine - [facility]  9:16 AM

## 2020-10-06 ENCOUNTER — PATIENT OUTREACH (OUTPATIENT)
Dept: ADMINISTRATIVE | Facility: OTHER | Age: 70
End: 2020-10-06

## 2020-10-07 ENCOUNTER — OFFICE VISIT (OUTPATIENT)
Dept: PODIATRY | Facility: CLINIC | Age: 70
End: 2020-10-07
Payer: MEDICARE

## 2020-10-07 VITALS
SYSTOLIC BLOOD PRESSURE: 129 MMHG | HEART RATE: 69 BPM | HEIGHT: 66 IN | BODY MASS INDEX: 22.5 KG/M2 | DIASTOLIC BLOOD PRESSURE: 60 MMHG | WEIGHT: 140 LBS

## 2020-10-07 DIAGNOSIS — Q82.8 POROKERATOSIS: Primary | ICD-10-CM

## 2020-10-07 PROCEDURE — 99214 OFFICE O/P EST MOD 30 MIN: CPT | Mod: PBBFAC,PN | Performed by: PODIATRIST

## 2020-10-07 PROCEDURE — 99499 NO LOS: ICD-10-PCS | Mod: ,,, | Performed by: PODIATRIST

## 2020-10-07 PROCEDURE — 17999 UNLISTD PX SKN MUC MEMB SUBQ: CPT | Mod: CSM,,, | Performed by: PODIATRIST

## 2020-10-07 PROCEDURE — 99999 PR PBB SHADOW E&M-EST. PATIENT-LVL IV: CPT | Mod: PBBFAC,,, | Performed by: PODIATRIST

## 2020-10-07 PROCEDURE — 99499 UNLISTED E&M SERVICE: CPT | Mod: ,,, | Performed by: PODIATRIST

## 2020-10-07 PROCEDURE — 17999 PR NON-COVERED FOOT CARE: ICD-10-PCS | Mod: CSM,,, | Performed by: PODIATRIST

## 2020-10-07 PROCEDURE — 99999 PR PBB SHADOW E&M-EST. PATIENT-LVL IV: ICD-10-PCS | Mod: PBBFAC,,, | Performed by: PODIATRIST

## 2020-10-07 NOTE — PROGRESS NOTES
Subjective:      Patient ID: Eliz Hartman is a 70 y.o. female.    Chief Complaint: Callouses (Pain Callus bilateral)    Burning throbbing pain and callus right 5th mtpj/forefoot.  Gradual onset, worsening over past several weeks, aggravated by increased weight bearing, shoe gear, pressure.  No previous medical treatment.  No self treatment. Denies trauma, surgery.     Relates regular care in [place] from her podiatrist who spends 45 min or more very slowly debriding her calluses.    Urea topically helps some with 5th toe callus not present today.    Review of Systems   Constitution: Negative for chills, diaphoresis, fever, malaise/fatigue and night sweats.   Cardiovascular: Negative for claudication, cyanosis, leg swelling and syncope.   Skin: Positive for suspicious lesions. Negative for color change, dry skin, nail changes, rash and unusual hair distribution.   Musculoskeletal: Negative for falls, joint pain, joint swelling, muscle cramps, muscle weakness and stiffness.   Gastrointestinal: Negative for constipation, diarrhea, nausea and vomiting.   Neurological: Negative for brief paralysis, disturbances in coordination, focal weakness, numbness, paresthesias, sensory change and tremors.           Objective:      Physical Exam  Constitutional:       General: She is not in acute distress.     Appearance: She is well-developed. She is not diaphoretic.   Cardiovascular:      Pulses:           Popliteal pulses are 2+ on the right side and 2+ on the left side.        Dorsalis pedis pulses are 2+ on the right side and 2+ on the left side.        Posterior tibial pulses are 2+ on the right side and 2+ on the left side.      Comments: Capillary refill 3 seconds all toes/distal feet, all toes/both feet warm to touch.      Negative lymphadenopathy bilateral popliteal fossa and tarsal tunnel.      Negavie lower extremity edema bilateral.    Musculoskeletal:      Right ankle: She exhibits normal range of motion, no swelling,  no ecchymosis, no deformity, no laceration and normal pulse. Achilles tendon normal. Achilles tendon exhibits no pain, no defect and normal Fields's test results.      Comments: Normal angle, base, station of gait. All ten toes without clubbing, cyanosis, or signs of ischemia.  No pain to palpation bilateral lower extremities.  Range of motion, stability, muscle strength, and muscle tone normal bilateral feet and legs.    Lymphadenopathy:      Lower Body: No right inguinal adenopathy. No left inguinal adenopathy.      Comments: Negative lymphadenopathy bilateral popliteal fossa and tarsal tunnel.    Negative lymphangitic streaking bilateral feet/ankles/legs.   Skin:     General: Skin is warm and dry.      Capillary Refill: Capillary refill takes 2 to 3 seconds.      Coloration: Skin is not pale.      Findings: No abrasion, bruising, burn, ecchymosis, erythema, laceration, lesion or rash.      Nails: There is no clubbing.        Comments:   Focal hyperkeratotic lesion consisting entirely of hyperkeratotic tissue without open skin, drainage, pus, fluctuance, malodor, or signs of infection medial plantar hallux ipj right and left and plantar right 5th mtpj.    Otherwise, Skin is normal age and health appropriate color, turgor, texture, and temperature bilateral lower extremities without ulceration, hyperpigmentation, discoloration, masses nodules or cords palpated.  No ecchymosis, erythema, edema, or cardinal signs of infection bilateral lower extremities.    All nails normal color and trophic qualities.      Neurological:      Mental Status: She is alert and oriented to person, place, and time.      Sensory: No sensory deficit.      Motor: No tremor, atrophy or abnormal muscle tone.      Gait: Gait normal.      Deep Tendon Reflexes:      Reflex Scores:       Patellar reflexes are 2+ on the right side and 2+ on the left side.       Achilles reflexes are 2+ on the right side and 2+ on the left side.     Comments:  Negative tinel sign to percussion sural, superficial peroneal, deep peroneal, saphenous, and posterior tibial nerves right and left ankles and feet.     Psychiatric:         Behavior: Behavior is cooperative.               Assessment:       Encounter Diagnosis   Name Primary?    Porokeratosis Yes         Plan:       Eliz was seen today for callouses.    Diagnoses and all orders for this visit:    Porokeratosis      I counseled the patient on her conditions, their implications and medical management.    With the patient's permission, I debrided hyperkeratotic lesion(s) as above totaling  3 to, not  Including dermis with sterile #15 blade.  Patient tolerated the procedure well and related significant relief.    Rx lac hydrin - continue prn.    otc urea prn          Follow up in about 1 year (around 10/7/2021).

## 2020-10-07 NOTE — PROGRESS NOTES
Health Maintenance Due   Topic Date Due    Hepatitis C Screening  1950    TETANUS VACCINE  09/10/1966    Shingles Vaccine (2 of 3) 12/19/2016    Pneumococcal Vaccine (65+ Low/Medium Risk) (2 of 2 - PPSV23) 05/24/2019    Influenza Vaccine (1) 08/01/2020     Updates were requested from care everywhere.  Chart was reviewed for overdue Proactive [facility] Encounters ([facility]) topics (CRS, Breast Cancer Screening, Eye exam)  Health Maintenance has been updated.  LINKS immunization registry triggered.  Immunizations were reconciled.

## 2021-03-19 ENCOUNTER — PES CALL (OUTPATIENT)
Dept: ADMINISTRATIVE | Facility: CLINIC | Age: 71
End: 2021-03-19

## 2021-03-24 ENCOUNTER — PES CALL (OUTPATIENT)
Dept: ADMINISTRATIVE | Facility: CLINIC | Age: 71
End: 2021-03-24

## 2021-08-27 ENCOUNTER — PATIENT OUTREACH (OUTPATIENT)
Dept: ADMINISTRATIVE | Facility: HOSPITAL | Age: 71
End: 2021-08-27

## 2021-08-27 ENCOUNTER — PATIENT MESSAGE (OUTPATIENT)
Dept: ADMINISTRATIVE | Facility: HOSPITAL | Age: 71
End: 2021-08-27

## 2022-02-24 ENCOUNTER — PES CALL (OUTPATIENT)
Dept: ADMINISTRATIVE | Facility: CLINIC | Age: 72
End: 2022-02-24
Payer: MEDICARE

## 2022-02-28 NOTE — PROGRESS NOTES
INTERNAL MEDICINE URGENT CARE NOTE    CHIEF COMPLAINT     Chief Complaint   Patient presents with    Cough     x 3 days       HPI     Eliz Hartman is a 68 y.o. female with afib, MNG, osteoporosis, and hematuria who presents for an urgent visit today.  She is an established pt of Dr Jack     Here with c/o cough x 3 days.   +chest congestion.   +sore throat   No nasal congestion, PND, ear pain.   Denies wheezing and SOB.   Denies fever and chills.   Treated with throat lozenges.        Past Medical History:  Past Medical History:   Diagnosis Date    Atrial fibrillation     Multiple thyroid nodules     Osteoporosis     Pacemaker        Home Medications:  Prior to Admission medications    Medication Sig Start Date End Date Taking? Authorizing Provider   ammonium lactate 12 % Crea Apply 1 application topically once daily. 7/17/17  Yes Zahida Streeter DPM   CALCIUM CARBONATE/VITAMIN D3 (CALCIUM 500 WITH D ORAL) Take 2 each by mouth once daily at 6am.   Yes Historical Provider, MD   denosumab (PROLIA SUBQ) Inject into the skin.   Yes Historical Provider, MD   diclofenac sodium (VOLTAREN) 1 % Gel Apply 2 g topically 2 (two) times daily. 7/18/17  Yes Zahida Streeter DPM   multivitamin capsule Take 1 capsule by mouth once daily.   Yes Historical Provider, MD   XARELTO 20 mg Tab Take 20 mg by mouth once daily. 3/18/17  Yes Historical Provider, MD   estradiol (ESTRACE) 0.01 % (0.1 mg/gram) vaginal cream Place 1 g vaginally 3 (three) times a week. Place by fingertip application before bedtime three times a week (Monday, Wednesday, Friday) 3/20/17 4/19/17  Rosie Monaco MD   triamcinolone acetonide 0.1% (KENALOG) 0.1 % cream Apply topically 2 (two) times daily. 5/24/18 6/3/18  Yolette Jack MD       Review of Systems:  Review of Systems   Constitutional: Negative for chills, fatigue and fever.   HENT: Positive for postnasal drip. Negative for congestion, rhinorrhea, sinus pressure, sinus pain and sore  Report received from [facility].  Care assumed at this time "throat.    Respiratory: Positive for cough. Negative for shortness of breath and wheezing.    Cardiovascular: Negative for chest pain and palpitations.   Gastrointestinal: Negative for abdominal pain, constipation, diarrhea, nausea and vomiting.        No heartburn    Skin: Negative for rash.   Neurological: Negative for dizziness, light-headedness and headaches.       Health Maintainence:   Immunizations:  Health Maintenance       Date Due Completion Date    Hepatitis C Screening 1950 ---    TETANUS VACCINE 09/10/1966 ---    Influenza Vaccine 08/01/2018 ---    Mammogram 01/26/2019 1/26/2017    Override on 1/12/2017: Done ([facility])    Pneumococcal (65+) (2 of 2 - PPSV23) 05/24/2019 5/24/2018    DEXA SCAN 10/03/2021 10/3/2018    Override on 11/1/2017: Done (done in [place], reports osteopenia)    Colonoscopy 01/01/2023 1/1/2013 (Done)    Override on 1/1/2013: Done (done 5 yrs ago and no polyps per pt, can repeat in 2023)    Lipid Panel 06/15/2023 6/15/2018           PHYSICAL EXAM     /72 (BP Location: Right arm, Patient Position: Sitting, BP Method: Large (Manual))   Pulse 69   Temp 98.4 °F (36.9 °C) (Oral)   Ht 5' 6" (1.676 m)   Wt 72.9 kg (160 lb 11.5 oz)   SpO2 97%   BMI 25.94 kg/m²     Physical Exam   Constitutional: She is oriented to person, place, and time. She appears well-developed and well-nourished.   HENT:   Head: Normocephalic.   Right Ear: External ear normal.   Left Ear: External ear normal.   Nose: Nose normal.   Mouth/Throat: Oropharynx is clear and moist. No oropharyngeal exudate.   Eyes: Pupils are equal, round, and reactive to light.   Neck: Neck supple. No JVD present. No tracheal deviation present. No thyromegaly present.   Cardiovascular: Normal rate, regular rhythm, normal heart sounds and intact distal pulses. Exam reveals no gallop and no friction rub.   No murmur heard.  Pulmonary/Chest: Effort normal. No respiratory distress. She has no wheezes. She has rhonchi in the left " lower field.   Abdominal: Soft. Bowel sounds are normal. She exhibits no distension. There is no tenderness.   Musculoskeletal: Normal range of motion. She exhibits no edema or tenderness.   Lymphadenopathy:     She has no cervical adenopathy.   Neurological: She is alert and oriented to person, place, and time.   Skin: Skin is warm and dry. No rash noted.   Psychiatric: She has a normal mood and affect. Her behavior is normal.   Vitals reviewed.      LABS     No results found for: LABA1C, HGBA1C  CMP  Sodium   Date Value Ref Range Status   06/15/2018 142 136 - 145 mmol/L Final     Potassium   Date Value Ref Range Status   06/15/2018 4.8 3.5 - 5.1 mmol/L Final     Chloride   Date Value Ref Range Status   06/15/2018 106 95 - 110 mmol/L Final     CO2   Date Value Ref Range Status   06/15/2018 28 23 - 29 mmol/L Final     Glucose   Date Value Ref Range Status   06/15/2018 94 70 - 110 mg/dL Final     BUN, Bld   Date Value Ref Range Status   06/15/2018 18 8 - 23 mg/dL Final     Creatinine   Date Value Ref Range Status   06/15/2018 0.8 0.5 - 1.4 mg/dL Final     Calcium   Date Value Ref Range Status   06/15/2018 9.3 8.7 - 10.5 mg/dL Final     Total Protein   Date Value Ref Range Status   06/15/2018 6.4 6.0 - 8.4 g/dL Final     Albumin   Date Value Ref Range Status   06/15/2018 3.6 3.5 - 5.2 g/dL Final     Total Bilirubin   Date Value Ref Range Status   06/15/2018 0.5 0.1 - 1.0 mg/dL Final     Comment:     For infants and newborns, interpretation of results should be based  on gestational age, weight and in agreement with clinical  observations.  Premature Infant recommended reference ranges:  Up to 24 hours.............<8.0 mg/dL  Up to 48 hours............<12.0 mg/dL  3-5 days..................<15.0 mg/dL  6-29 days.................<15.0 mg/dL       Alkaline Phosphatase   Date Value Ref Range Status   06/15/2018 47 (L) 55 - 135 U/L Final     AST   Date Value Ref Range Status   06/15/2018 24 10 - 40 U/L Final     ALT   Date  Value Ref Range Status   06/15/2018 15 10 - 44 U/L Final     Anion Gap   Date Value Ref Range Status   06/15/2018 8 8 - 16 mmol/L Final     eGFR if    Date Value Ref Range Status   06/15/2018 >60 >60 mL/min/1.73 m^2 Final     eGFR if non    Date Value Ref Range Status   06/15/2018 >60 >60 mL/min/1.73 m^2 Final     Comment:     Calculation used to obtain the estimated glomerular filtration  rate (eGFR) is the CKD-EPI equation.        Lab Results   Component Value Date    WBC 4.44 06/15/2018    HGB 13.8 06/15/2018    HCT 41.7 06/15/2018     (H) 06/15/2018     06/15/2018     Lab Results   Component Value Date    CHOL 196 06/15/2018     Lab Results   Component Value Date    HDL 78 (H) 06/15/2018     Lab Results   Component Value Date    LDLCALC 104.8 06/15/2018     Lab Results   Component Value Date    TRIG 66 06/15/2018     Lab Results   Component Value Date    CHOLHDL 39.8 06/15/2018     Lab Results   Component Value Date    TSH 0.996 06/15/2018       ASSESSMENT/PLAN     Eliz Hartman is a 68 y.o. female with  Past Medical History:   Diagnosis Date    Atrial fibrillation     Multiple thyroid nodules     Osteoporosis     Pacemaker      Cough- likely Viral URI. Will start cough suppressant. Mucinex bid with full glass of water. Rest and fluids.   -     X-Ray Chest PA And Lateral; Future; Expected date: 11/09/2018  -     benzonatate (TESSALON) 100 MG capsule; Take 1 capsule (100 mg total) by mouth 3 (three) times daily as needed.  Dispense: 30 capsule; Refill: 0  -     guaifenesin-codeine 100-10 mg/5 ml (TUSSI-ORGANIDIN NR)  mg/5 mL syrup; Take 5 mLs by mouth 3 (three) times daily as needed for Cough.  Dispense: 118 mL; Refill: 0    Fatigue, unspecified type  -     X-Ray Chest PA And Lateral; Future; Expected date: 11/09/2018    Follow up as needed and with PCP     Patient education provided from [facility]. Patient was counseled on when and how to seek emergent care.        Aletha RAJPUT, KAUR, FNP-c   Department of Internal Medicine - [facility]  12:57 PM

## 2022-10-26 ENCOUNTER — PES CALL (OUTPATIENT)
Dept: ADMINISTRATIVE | Facility: CLINIC | Age: 72
End: 2022-10-26
Payer: MEDICARE